# Patient Record
Sex: FEMALE | Race: WHITE | NOT HISPANIC OR LATINO
[De-identification: names, ages, dates, MRNs, and addresses within clinical notes are randomized per-mention and may not be internally consistent; named-entity substitution may affect disease eponyms.]

---

## 2018-01-10 ENCOUNTER — APPOINTMENT (OUTPATIENT)
Dept: INTERNAL MEDICINE | Facility: CLINIC | Age: 18
End: 2018-01-10
Payer: COMMERCIAL

## 2018-01-10 PROCEDURE — 36415 COLL VENOUS BLD VENIPUNCTURE: CPT

## 2018-01-10 PROCEDURE — 99203 OFFICE O/P NEW LOW 30 MIN: CPT | Mod: 25

## 2018-01-15 ENCOUNTER — APPOINTMENT (OUTPATIENT)
Dept: INTERNAL MEDICINE | Facility: CLINIC | Age: 18
End: 2018-01-15
Payer: COMMERCIAL

## 2018-01-15 PROCEDURE — 36415 COLL VENOUS BLD VENIPUNCTURE: CPT

## 2018-01-15 PROCEDURE — 99214 OFFICE O/P EST MOD 30 MIN: CPT | Mod: 25

## 2018-12-27 ENCOUNTER — FORM ENCOUNTER (OUTPATIENT)
Age: 18
End: 2018-12-27

## 2019-07-30 ENCOUNTER — APPOINTMENT (OUTPATIENT)
Dept: INTERNAL MEDICINE | Facility: CLINIC | Age: 19
End: 2019-07-30
Payer: COMMERCIAL

## 2019-07-30 VITALS
RESPIRATION RATE: 16 BRPM | WEIGHT: 140 LBS | HEART RATE: 81 BPM | DIASTOLIC BLOOD PRESSURE: 39 MMHG | BODY MASS INDEX: 26.43 KG/M2 | OXYGEN SATURATION: 98 % | HEIGHT: 61 IN | SYSTOLIC BLOOD PRESSURE: 91 MMHG

## 2019-07-30 PROCEDURE — 99214 OFFICE O/P EST MOD 30 MIN: CPT

## 2019-07-30 NOTE — HISTORY OF PRESENT ILLNESS
[FreeTextEntry1] : 18-year-old female who is now 36 weeks pregnant was seen at [facility] with acute pyelonephritis and septicemia with Klebsiella. Prior to this admission she also was seen by different infectious disease group for Klebsiella septicemia pyelonephritis complicated with ICU admission and respiratory failure with ARDS. During that admission patient had a ureteral stent placed and was discharged on IV antibiotics. She was continued on oral antibiotic suppression however was readmitted this past addition with Klebsiella septicemia and pyelonephritis at which time we saw the patient. Patient was to do with IV Zosyn which was switched to IV Invanz and discharged for easier once a day dosing. Patient is allergic to cephalosporin Omnicef which the allergy occurred when she was a child. No cephalosporin was trialed in the hospital so she is currently on IV ertapenem.\par She is here for followup. Reports no diarrhea. No fever no chills. No nausea no vomiting. No urinary symptoms.

## 2019-07-30 NOTE — PHYSICAL EXAM
[General Appearance - Alert] : alert [General Appearance - In No Acute Distress] : in no acute distress [General Appearance - Well Nourished] : well nourished [General Appearance - Well-Appearing] : healthy appearing [Sclera] : the sclera and conjunctiva were normal [PERRL With Normal Accommodation] : pupils were equal in size, round, reactive to light [Extraocular Movements] : extraocular movements were intact [Oropharynx] : the oropharynx was normal with no thrush [Outer Ear] : the ears and nose were normal in appearance [Neck Appearance] : the appearance of the neck was normal [Jugular Venous Distention Increased] : there was no jugular-venous distention [Respiration, Rhythm And Depth] : normal respiratory rhythm and effort [Exaggerated Use Of Accessory Muscles For Inspiration] : no accessory muscle use [Auscultation Breath Sounds / Voice Sounds] : lungs were clear to auscultation bilaterally [Heart Rate And Rhythm] : heart rate was normal and rhythm regular [Heart Sounds] : normal S1 and S2 [Full Pulse] : the pedal pulses are present [Edema] : there was no peripheral edema [Bowel Sounds] : normal bowel sounds [Abdomen Soft] : soft [Costovertebral Angle Tenderness] : no CVA tenderness [Musculoskeletal - Swelling] : no joint swelling [Range of Motion to Joints] : range of motion to joints [Nail Clubbing] : no clubbing  or cyanosis of the fingernails [Skin Color & Pigmentation] : normal skin color and pigmentation [Motor Tone] : muscle strength and tone were normal [] : no rash [Motor Exam] : the motor exam was normal [No Focal Deficits] : no focal deficits [Oriented To Time, Place, And Person] : oriented to person, place, and time [Affect] : the affect was normal

## 2019-07-30 NOTE — ASSESSMENT
[FreeTextEntry1] : 18-year-old female here for followup of Klebsiella septicemia and pyelonephritis currently on IV ertapenem\par \par \par Klebsiella septicemia\par Klebsiella pyelonephritis\par Pregnancy \par \par \par Recommendations:\par Patient completed 2 weeks of IV ertapenem for negative blood cultures\par Given she is high risk for infections or urinary tract and this is her second episode Will continue IV ertapenem for a longer duration\par Patient currently not having any side effects of the IV ertapenem\par Labs reviewed\par \par Followup in 2-4 weeks\par \par Counseling included lab results, differential diagnosis, treatment options, risks and benefits, lifestyle changes, current condition, medications and dose adjustments.\par The patient was interactive attentive and asked questions and verbalized understanding.\par

## 2020-04-14 ENCOUNTER — FORM ENCOUNTER (OUTPATIENT)
Age: 20
End: 2020-04-14

## 2021-05-18 ENCOUNTER — FORM ENCOUNTER (OUTPATIENT)
Age: 21
End: 2021-05-18

## 2021-11-17 ENCOUNTER — NON-APPOINTMENT (OUTPATIENT)
Age: 21
End: 2021-11-17

## 2021-12-17 ENCOUNTER — NON-APPOINTMENT (OUTPATIENT)
Age: 21
End: 2021-12-17

## 2021-12-17 DIAGNOSIS — Z98.890 OTHER SPECIFIED POSTPROCEDURAL STATES: ICD-10-CM

## 2021-12-17 DIAGNOSIS — Z78.9 OTHER SPECIFIED HEALTH STATUS: ICD-10-CM

## 2022-01-12 ENCOUNTER — APPOINTMENT (OUTPATIENT)
Dept: OBGYN | Facility: CLINIC | Age: 22
End: 2022-01-12

## 2022-02-09 ENCOUNTER — APPOINTMENT (OUTPATIENT)
Dept: OBGYN | Facility: CLINIC | Age: 22
End: 2022-02-09

## 2023-02-06 ENCOUNTER — APPOINTMENT (OUTPATIENT)
Dept: OBGYN | Facility: CLINIC | Age: 23
End: 2023-02-06
Payer: COMMERCIAL

## 2023-02-06 VITALS
HEIGHT: 61 IN | DIASTOLIC BLOOD PRESSURE: 80 MMHG | WEIGHT: 126 LBS | BODY MASS INDEX: 23.79 KG/M2 | SYSTOLIC BLOOD PRESSURE: 128 MMHG | HEART RATE: 123 BPM

## 2023-02-06 DIAGNOSIS — Z82.49 FAMILY HISTORY OF ISCHEMIC HEART DISEASE AND OTHER DISEASES OF THE CIRCULATORY SYSTEM: ICD-10-CM

## 2023-02-06 DIAGNOSIS — N76.0 ACUTE VAGINITIS: ICD-10-CM

## 2023-02-06 DIAGNOSIS — Z80.1 FAMILY HISTORY OF MALIGNANT NEOPLASM OF TRACHEA, BRONCHUS AND LUNG: ICD-10-CM

## 2023-02-06 DIAGNOSIS — Z09 ENCOUNTER FOR FOLLOW-UP EXAMINATION AFTER COMPLETED TREATMENT FOR CONDITIONS OTHER THAN MALIGNANT NEOPLASM: ICD-10-CM

## 2023-02-06 DIAGNOSIS — Z87.42 PERSONAL HISTORY OF OTHER DISEASES OF THE FEMALE GENITAL TRACT: ICD-10-CM

## 2023-02-06 DIAGNOSIS — Z86.19 PERSONAL HISTORY OF OTHER INFECTIOUS AND PARASITIC DISEASES: ICD-10-CM

## 2023-02-06 PROCEDURE — 81002 URINALYSIS NONAUTO W/O SCOPE: CPT

## 2023-02-06 PROCEDURE — 0500F INITIAL PRENATAL CARE VISIT: CPT

## 2023-02-06 NOTE — PLAN
[FreeTextEntry1] : Patient 22-year-old  3 para 1-0-1-1 last menstrual period 2022\par The patient presents as a new patient transfer OB care with due date of 2023\par \par Past medical history,,,, pyelonephritis, renal stones\par Past surgical history, calm, renal stents, lithotripsy, potentially curettage\par Allergies,,, Omnicef\par Medications,,, Augmentin daily 875 mg) vitamins\par Past OB history vaginal delivery x1 with a postpartum pyelonephritis–sepsis, calm, termination x1\par Past GYN history,,, medication 14, interval 28, duration 7 days, patient states she had been on birth control for approximately 1 year in the past, had a Mirena IUD for approximately 2-1/2 years, calm, and denies any history of pelvic infections or STDs\par Tobacco use,,, patient denies\par Alcohol use, cocaine patient denies\par Drug use,,, patient denies\par Family history,,, mother father alive and well,,, maternal great aunt with history of ovarian cancer\par \par Patient states she has been followed by maternal-fetal medicine at [facility] and also by urologist because of the recurrent UTIs and Cyrus nephritis and was recently hospitalized in December for pyelonephritis at which point a stent was advised to be placed and patient declined\par At this point patient is on Augmentin on a daily basis as a suppressive therapy\par Patient is asked to sign a record release to obtain all the previous records and will be referred to maternal-fetal medicine for further follow-up as needed

## 2023-02-06 NOTE — HISTORY OF PRESENT ILLNESS
[FreeTextEntry1] : Patient is a 22-year-old  3 para 1-0-1-1 last menstrual period 2022\par Patient presents as a transfer OB patient with a due date of

## 2023-02-07 PROBLEM — Z82.49 FAMILY HISTORY OF HYPERTENSION: Status: ACTIVE | Noted: 2023-02-07

## 2023-02-07 PROBLEM — Z80.1 FAMILY HISTORY OF LUNG CANCER: Status: ACTIVE | Noted: 2023-02-07

## 2023-02-07 PROBLEM — Z82.49 FAMILY HISTORY OF HYPOTENSION: Status: ACTIVE | Noted: 2023-02-07

## 2023-02-07 PROBLEM — Z86.19 HISTORY OF SEPSIS: Status: RESOLVED | Noted: 2023-02-07 | Resolved: 2023-02-07

## 2023-02-07 PROBLEM — N76.0 ACUTE VAGINITIS: Status: RESOLVED | Noted: 2021-11-17 | Resolved: 2023-02-07

## 2023-02-07 PROBLEM — Z87.42 HISTORY OF OVARIAN CYST: Status: RESOLVED | Noted: 2023-02-07 | Resolved: 2023-02-07

## 2023-02-07 RX ORDER — AMOXICILLIN AND CLAVULANATE POTASSIUM 500; 125 MG/1; 1/1
TABLET, FILM COATED ORAL
Refills: 0 | Status: ACTIVE | COMMUNITY

## 2023-02-21 ENCOUNTER — NON-APPOINTMENT (OUTPATIENT)
Age: 23
End: 2023-02-21

## 2023-02-22 ENCOUNTER — NON-APPOINTMENT (OUTPATIENT)
Age: 23
End: 2023-02-22

## 2023-02-22 ENCOUNTER — APPOINTMENT (OUTPATIENT)
Dept: ANTEPARTUM | Facility: CLINIC | Age: 23
End: 2023-02-22

## 2023-02-22 ENCOUNTER — APPOINTMENT (OUTPATIENT)
Dept: MATERNAL FETAL MEDICINE | Facility: CLINIC | Age: 23
End: 2023-02-22
Payer: COMMERCIAL

## 2023-02-22 VITALS
HEIGHT: 61 IN | RESPIRATION RATE: 18 BRPM | SYSTOLIC BLOOD PRESSURE: 100 MMHG | OXYGEN SATURATION: 98 % | DIASTOLIC BLOOD PRESSURE: 60 MMHG | HEART RATE: 88 BPM | WEIGHT: 128 LBS | BODY MASS INDEX: 24.17 KG/M2

## 2023-02-22 DIAGNOSIS — Z92.89 PERSONAL HISTORY OF OTHER MEDICAL TREATMENT: ICD-10-CM

## 2023-02-22 DIAGNOSIS — A49.8 OTHER BACTERIAL INFECTIONS OF UNSPECIFIED SITE: ICD-10-CM

## 2023-02-22 DIAGNOSIS — Z98.890 OTHER SPECIFIED POSTPROCEDURAL STATES: ICD-10-CM

## 2023-02-22 DIAGNOSIS — Z87.442 PERSONAL HISTORY OF URINARY CALCULI: ICD-10-CM

## 2023-02-22 DIAGNOSIS — N20.0 CALCULUS OF KIDNEY: ICD-10-CM

## 2023-02-22 DIAGNOSIS — Z87.448 PERSONAL HISTORY OF OTHER DISEASES OF URINARY SYSTEM: ICD-10-CM

## 2023-02-22 PROCEDURE — 99205 OFFICE O/P NEW HI 60 MIN: CPT

## 2023-02-22 NOTE — ACTIVE PROBLEMS
[Diabetes Mellitus] : no diabetes mellitus [Hypertension] : no hypertension [Heart Disease] : no heart disease [Autoimmune Disease] : no autoimmune disease [Neurologic Disorder] : no neurologic disorder, no epilepsy [Psychiatric Disorders] : no psychiatric disorders [Depression] : no depression, no post partum depression [Hepatic Disorder] : no hepatitis, no liver disease [Thrombophlebitis] : no varicosities, no phlebitis [Thyroid Disorder] : no thyroid dysfunction [Trauma] : no trauma/violence

## 2023-02-22 NOTE — DISCUSSION/SUMMARY
[FreeTextEntry1] : She is 22 weeks and 1 day gestation by her last menstrual period dates.\par \par She had pyelonephritis and sepsis during her prior pregnancy in 2019 at 22 weeks gestation. She was admitted to an ICU with respiratory failure due to ARDS, requiring ventilation for 3 days. She had a kidney stent placed. She was found to have anemia and was given 2 units of blood. She was d/c with a PICC line on antibiotics.  At 32 weeks gestation she was again hospitalized for another kidney infection & sepsis. She had a new kidney stent placed and was d/c home on antibiotics. This pregnancy she was hospitalized at [facility] on 2022 for treatment of pyelonephritis. A kidney stent was recommended by the urology consultant but patient declined. She has been placed on daily prophylactic antibiotics (Augmentin 875 mg). She is currently being followed monthly by a urologist (Dr. Baez). \par \par She was diagnosed with kidney stones during her prior pregnancy. She has been told that her episodes of pyelonephritis are due to kidney stones. She states that she had lithotripsy on 2022 before she became pregnant to destroy one large (22 mm) kidney stone in her left kidney. She still has kidney stones in both kidneys.  I recommended following the plan of care provided by the urology consultant. I told her that treatment of renal stones during pregnancy is usually conservative with hydration, appropriate antibiotic therapy, and pain relief with systemic analgesics.  I also told her that stent placement between the bladder and kidney under local anesthesia or sonographically guided percutaneous lithotomy can be performed during pregnancy when there is obstruction of the ureter by kidney stones. \par \par I recommended having the COVID 19 vaccine during pregnancy if not already vaccinated.  She told me that she has not received the COVID-19 vaccine. I told her that the American College of Obstetricians and Gynecologists (ACOG) and the Society for Maternal Fetal Medicine (SMFM) are recommending that all pregnant women be vaccinated against COVID-19. I discussed the benefits and risks of COVID-19 vaccination. I told her of the reported safety of the COVID-19 vaccines during pregnancy. There is no evidence of adverse maternal or fetal effects from vaccinating pregnant women with COVID-19 vaccine. The data has shown that COVID-19 infection puts pregnant women at increased risk of severe complications and death.  Maternal vaccination also provides protection to the  through the placental transfer of maternal antibodies. I told her that mild side effects such as pain at the site of injection, fever, muscle pain, joint pain, headaches, fatigue, and other symptoms may be present after vaccination. Tylenol (acetaminophen) is recommended for pregnant women who experience fever or other side effects. These side effects are a normal part of the body´s reaction to the vaccine and the developing of antibodies to protect against the COVID-19 illness. None of the COVID-19 vaccines available in the [place] cause infertility.  I recommend vaccination with the mRNA vaccines. There are rare allergic reactions (including anaphylaxis), 4.7 per million for Pfizer-DECA and 2.5 per million for Moderna following COVID-19 vaccination in nonpregnant individuals. Management of anaphylaxis in pregnant individuals is the same as in nonpregnant individuals. She told me that she is going to request to have the COVID 19 vaccine.\par \par I recommended having the Tdap vaccine between 27 and 36 weeks of gestation to prevent pertussis infection in the  infant. I recommended having the flu vaccine during flu season (October through May).  She told me that she received the flu vaccine during 2022.

## 2023-02-22 NOTE — VITALS
[LMP (date): ___] : LMP was on [unfilled] [GA =___ Weeks] : which calculates to a GA of [unfilled] weeks [GA= ___ Days] : and [unfilled] day(s) [MERVIN by LMP (date): ___] : The calculated MERVIN by LMP is [unfilled] [By LMP] : this is the final MERVIN

## 2023-02-22 NOTE — OB HISTORY
[Pregnancy History] : patient received anesthesia [___] : pregnancy complications occured [LMP: ___] : LMP: [unfilled] [MERVIN: ___] : MERVIN: [unfilled] [EGA: ___ wks] : EGA: [unfilled] wks [Spontaneous] : Spontaneous conception [Sonogram] : sonogram [at ___ wks] : at [unfilled] weeks [Definite:  ___ (Date)] : the last menstrual period was [unfilled] [Normal Amount/Duration] : was of a normal amount and duration [Regular Cycle Intervals] : periods have been regular [FreeTextEntry1] : She had a maternal-fetal medicine consultation at [facility] on 2/3/2023. Patient has transferred her care to Dr. Hemphill.  First prenatal visit was on 2/7/2023.  \par \par She had a noninvasive prenatal screen test done on 12/9/2022 that reported a low risk for fetal chromosomal abnormalities.  The fetal sex was reported to be male.\par \par She had fetal anatomy ultrasound examination on 2/3/2023 at 19 weeks of gestation at [facility].  No major fetal malformations were reported. [Spotting Between  Menses] : no spotting between menses [Menstrual Cramps] : no menstrual cramps [On BCP at conception] : the patient was not on BCP at conception

## 2023-02-22 NOTE — FAMILY HISTORY
[Age 35+ During Pregnancy] : not 35 or over during pregnancy [Reported Family History Of Birth Defects] : no congenital heart defects [Raúl-Sachs Carrier] : no Raúl-Sachs [Family History] : no mental retardation/autism [Reported Family History Of Genetic Disease] : no history of child defect in child of baby father

## 2023-03-06 ENCOUNTER — NON-APPOINTMENT (OUTPATIENT)
Age: 23
End: 2023-03-06

## 2023-03-06 ENCOUNTER — APPOINTMENT (OUTPATIENT)
Dept: OBGYN | Facility: CLINIC | Age: 23
End: 2023-03-06
Payer: COMMERCIAL

## 2023-03-06 VITALS
DIASTOLIC BLOOD PRESSURE: 75 MMHG | HEIGHT: 61 IN | WEIGHT: 132 LBS | HEART RATE: 118 BPM | BODY MASS INDEX: 24.92 KG/M2 | SYSTOLIC BLOOD PRESSURE: 122 MMHG

## 2023-03-06 PROCEDURE — 81002 URINALYSIS NONAUTO W/O SCOPE: CPT

## 2023-03-06 PROCEDURE — 0502F SUBSEQUENT PRENATAL CARE: CPT

## 2023-04-04 ENCOUNTER — APPOINTMENT (OUTPATIENT)
Dept: OBGYN | Facility: CLINIC | Age: 23
End: 2023-04-04
Payer: COMMERCIAL

## 2023-04-04 VITALS — WEIGHT: 140 LBS | DIASTOLIC BLOOD PRESSURE: 74 MMHG | HEART RATE: 116 BPM | SYSTOLIC BLOOD PRESSURE: 114 MMHG

## 2023-04-04 DIAGNOSIS — N76.0 ACUTE VAGINITIS: ICD-10-CM

## 2023-04-04 PROCEDURE — 81002 URINALYSIS NONAUTO W/O SCOPE: CPT

## 2023-04-04 PROCEDURE — 0502F SUBSEQUENT PRENATAL CARE: CPT

## 2023-04-05 LAB
BILIRUB UR QL STRIP: NORMAL
GLUCOSE UR-MCNC: NORMAL
HCG UR QL: 0.2 EU/DL
HGB UR QL STRIP.AUTO: NORMAL
KETONES UR-MCNC: NORMAL
LEUKOCYTE ESTERASE UR QL STRIP: NORMAL
NITRITE UR QL STRIP: NORMAL
PH UR STRIP: 6
PROT UR STRIP-MCNC: NORMAL
SP GR UR STRIP: 1.02

## 2023-04-06 ENCOUNTER — APPOINTMENT (OUTPATIENT)
Dept: OBGYN | Facility: CLINIC | Age: 23
End: 2023-04-06

## 2023-04-06 ENCOUNTER — NON-APPOINTMENT (OUTPATIENT)
Age: 23
End: 2023-04-06

## 2023-04-06 ENCOUNTER — ASOB RESULT (OUTPATIENT)
Age: 23
End: 2023-04-06

## 2023-04-10 ENCOUNTER — NON-APPOINTMENT (OUTPATIENT)
Age: 23
End: 2023-04-10

## 2023-04-10 DIAGNOSIS — B37.31 ACUTE CANDIDIASIS OF VULVA AND VAGINA: ICD-10-CM

## 2023-04-10 DIAGNOSIS — Z3A.22 22 WEEKS GESTATION OF PREGNANCY: ICD-10-CM

## 2023-04-10 LAB
A VAGINAE DNA VAG QL NAA+PROBE: NORMAL
BVAB2 DNA VAG QL NAA+PROBE: NORMAL
C KRUSEI DNA VAG QL NAA+PROBE: NEGATIVE
C KRUSEI DNA VAG QL NAA+PROBE: POSITIVE
MEGA1 DNA VAG QL NAA+PROBE: NORMAL
T VAGINALIS RRNA SPEC QL NAA+PROBE: NEGATIVE

## 2023-04-13 ENCOUNTER — NON-APPOINTMENT (OUTPATIENT)
Age: 23
End: 2023-04-13

## 2023-04-17 ENCOUNTER — NON-APPOINTMENT (OUTPATIENT)
Age: 23
End: 2023-04-17

## 2023-04-19 ENCOUNTER — ASOB RESULT (OUTPATIENT)
Age: 23
End: 2023-04-19

## 2023-04-19 ENCOUNTER — APPOINTMENT (OUTPATIENT)
Dept: ANTEPARTUM | Facility: CLINIC | Age: 23
End: 2023-04-19
Payer: COMMERCIAL

## 2023-04-19 PROCEDURE — 76816 OB US FOLLOW-UP PER FETUS: CPT

## 2023-04-25 ENCOUNTER — NON-APPOINTMENT (OUTPATIENT)
Age: 23
End: 2023-04-25

## 2023-04-25 ENCOUNTER — APPOINTMENT (OUTPATIENT)
Dept: OBGYN | Facility: CLINIC | Age: 23
End: 2023-04-25
Payer: COMMERCIAL

## 2023-04-25 VITALS — SYSTOLIC BLOOD PRESSURE: 100 MMHG | DIASTOLIC BLOOD PRESSURE: 69 MMHG | HEART RATE: 103 BPM | WEIGHT: 141 LBS

## 2023-04-25 DIAGNOSIS — Z11.3 ENCOUNTER FOR SCREENING FOR INFECTIONS WITH A PREDOMINANTLY SEXUAL MODE OF TRANSMISSION: ICD-10-CM

## 2023-04-25 PROCEDURE — 0502F SUBSEQUENT PRENATAL CARE: CPT

## 2023-04-27 ENCOUNTER — NON-APPOINTMENT (OUTPATIENT)
Age: 23
End: 2023-04-27

## 2023-05-01 ENCOUNTER — NON-APPOINTMENT (OUTPATIENT)
Age: 23
End: 2023-05-01

## 2023-05-01 LAB
C TRACH RRNA SPEC QL NAA+PROBE: NOT DETECTED
HPV HIGH+LOW RISK DNA PNL CVX: NOT DETECTED
N GONORRHOEA RRNA SPEC QL NAA+PROBE: NOT DETECTED
SOURCE TP AMPLIFICATION: NORMAL

## 2023-05-04 ENCOUNTER — NON-APPOINTMENT (OUTPATIENT)
Age: 23
End: 2023-05-04

## 2023-05-04 ENCOUNTER — RESULT REVIEW (OUTPATIENT)
Age: 23
End: 2023-05-04

## 2023-05-04 ENCOUNTER — OUTPATIENT (OUTPATIENT)
Dept: INPATIENT UNIT | Facility: HOSPITAL | Age: 23
LOS: 1 days | End: 2023-05-04
Payer: COMMERCIAL

## 2023-05-04 VITALS — HEART RATE: 113 BPM | SYSTOLIC BLOOD PRESSURE: 111 MMHG | DIASTOLIC BLOOD PRESSURE: 61 MMHG

## 2023-05-04 DIAGNOSIS — O47.03 FALSE LABOR BEFORE 37 COMPLETED WEEKS OF GESTATION, THIRD TRIMESTER: ICD-10-CM

## 2023-05-04 DIAGNOSIS — Z98.890 OTHER SPECIFIED POSTPROCEDURAL STATES: Chronic | ICD-10-CM

## 2023-05-04 LAB
ALBUMIN SERPL ELPH-MCNC: 3.3 G/DL — SIGNIFICANT CHANGE UP (ref 3.3–5.2)
ALP SERPL-CCNC: 132 U/L — HIGH (ref 40–120)
ALT FLD-CCNC: 7 U/L — SIGNIFICANT CHANGE UP
ANION GAP SERPL CALC-SCNC: 13 MMOL/L — SIGNIFICANT CHANGE UP (ref 5–17)
APPEARANCE UR: CLEAR — SIGNIFICANT CHANGE UP
AST SERPL-CCNC: 15 U/L — SIGNIFICANT CHANGE UP
BACTERIA # UR AUTO: ABNORMAL
BILIRUB SERPL-MCNC: 0.4 MG/DL — SIGNIFICANT CHANGE UP (ref 0.4–2)
BILIRUB UR-MCNC: NEGATIVE — SIGNIFICANT CHANGE UP
BUN SERPL-MCNC: 8 MG/DL — SIGNIFICANT CHANGE UP (ref 8–20)
CALCIUM SERPL-MCNC: 8.2 MG/DL — LOW (ref 8.4–10.5)
CHLORIDE SERPL-SCNC: 99 MMOL/L — SIGNIFICANT CHANGE UP (ref 96–108)
CO2 SERPL-SCNC: 19 MMOL/L — LOW (ref 22–29)
COLOR SPEC: YELLOW — SIGNIFICANT CHANGE UP
CREAT SERPL-MCNC: 0.82 MG/DL — SIGNIFICANT CHANGE UP (ref 0.5–1.3)
DIFF PNL FLD: ABNORMAL
EGFR: 102 ML/MIN/1.73M2 — SIGNIFICANT CHANGE UP
EPI CELLS # UR: SIGNIFICANT CHANGE UP
GLUCOSE SERPL-MCNC: 92 MG/DL — SIGNIFICANT CHANGE UP (ref 70–99)
GLUCOSE UR QL: NEGATIVE MG/DL — SIGNIFICANT CHANGE UP
HCT VFR BLD CALC: 23.4 % — LOW (ref 34.5–45)
HGB BLD-MCNC: 7.7 G/DL — LOW (ref 11.5–15.5)
KETONES UR-MCNC: NEGATIVE — SIGNIFICANT CHANGE UP
LACTATE SERPL-SCNC: 0.5 MMOL/L — SIGNIFICANT CHANGE UP (ref 0.5–2)
LEUKOCYTE ESTERASE UR-ACNC: ABNORMAL
MCHC RBC-ENTMCNC: 27.9 PG — SIGNIFICANT CHANGE UP (ref 27–34)
MCHC RBC-ENTMCNC: 32.9 GM/DL — SIGNIFICANT CHANGE UP (ref 32–36)
MCV RBC AUTO: 84.8 FL — SIGNIFICANT CHANGE UP (ref 80–100)
NITRITE UR-MCNC: NEGATIVE — SIGNIFICANT CHANGE UP
PH UR: 6.5 — SIGNIFICANT CHANGE UP (ref 5–8)
PLATELET # BLD AUTO: 282 K/UL — SIGNIFICANT CHANGE UP (ref 150–400)
POTASSIUM SERPL-MCNC: 3.4 MMOL/L — LOW (ref 3.5–5.3)
POTASSIUM SERPL-SCNC: 3.4 MMOL/L — LOW (ref 3.5–5.3)
PROT SERPL-MCNC: 6.9 G/DL — SIGNIFICANT CHANGE UP (ref 6.6–8.7)
PROT UR-MCNC: NEGATIVE — SIGNIFICANT CHANGE UP
RBC # BLD: 2.76 M/UL — LOW (ref 3.8–5.2)
RBC # FLD: 12.3 % — SIGNIFICANT CHANGE UP (ref 10.3–14.5)
RBC CASTS # UR COMP ASSIST: SIGNIFICANT CHANGE UP /HPF (ref 0–4)
SODIUM SERPL-SCNC: 131 MMOL/L — LOW (ref 135–145)
SP GR SPEC: 1 — LOW (ref 1.01–1.02)
UROBILINOGEN FLD QL: NEGATIVE MG/DL — SIGNIFICANT CHANGE UP
WBC # BLD: 7.32 K/UL — SIGNIFICANT CHANGE UP (ref 3.8–10.5)
WBC # FLD AUTO: 7.32 K/UL — SIGNIFICANT CHANGE UP (ref 3.8–10.5)
WBC UR QL: ABNORMAL /HPF (ref 0–5)

## 2023-05-04 PROCEDURE — 85027 COMPLETE CBC AUTOMATED: CPT

## 2023-05-04 PROCEDURE — 83605 ASSAY OF LACTIC ACID: CPT

## 2023-05-04 PROCEDURE — 99214 OFFICE O/P EST MOD 30 MIN: CPT | Mod: GC

## 2023-05-04 PROCEDURE — 76775 US EXAM ABDO BACK WALL LIM: CPT

## 2023-05-04 PROCEDURE — 87086 URINE CULTURE/COLONY COUNT: CPT

## 2023-05-04 PROCEDURE — G0463: CPT

## 2023-05-04 PROCEDURE — 81001 URINALYSIS AUTO W/SCOPE: CPT

## 2023-05-04 PROCEDURE — 80053 COMPREHEN METABOLIC PANEL: CPT

## 2023-05-04 PROCEDURE — 76775 US EXAM ABDO BACK WALL LIM: CPT | Mod: 26

## 2023-05-04 PROCEDURE — 59025 FETAL NON-STRESS TEST: CPT

## 2023-05-04 PROCEDURE — 0225U NFCT DS DNA&RNA 21 SARSCOV2: CPT

## 2023-05-04 PROCEDURE — 36415 COLL VENOUS BLD VENIPUNCTURE: CPT

## 2023-05-04 RX ORDER — SODIUM CHLORIDE 9 MG/ML
1000 INJECTION, SOLUTION INTRAVENOUS ONCE
Refills: 0 | Status: COMPLETED | OUTPATIENT
Start: 2023-05-04 | End: 2023-05-04

## 2023-05-04 RX ORDER — POTASSIUM CHLORIDE 20 MEQ
40 PACKET (EA) ORAL ONCE
Refills: 0 | Status: COMPLETED | OUTPATIENT
Start: 2023-05-04 | End: 2023-05-04

## 2023-05-04 RX ORDER — IRON SUCROSE 20 MG/ML
200 INJECTION, SOLUTION INTRAVENOUS ONCE
Refills: 0 | Status: COMPLETED | OUTPATIENT
Start: 2023-05-04 | End: 2023-05-04

## 2023-05-04 RX ADMIN — SODIUM CHLORIDE 1000 MILLILITER(S): 9 INJECTION, SOLUTION INTRAVENOUS at 23:04

## 2023-05-04 RX ADMIN — Medication 40 MILLIEQUIVALENT(S): at 20:27

## 2023-05-04 RX ADMIN — IRON SUCROSE 110 MILLIGRAM(S): 20 INJECTION, SOLUTION INTRAVENOUS at 22:39

## 2023-05-04 NOTE — OB PROVIDER TRIAGE NOTE - NSHPLABSRESULTS_GEN_ALL_CORE
7.7    7.32  )-----------( 282      ( 04 May 2023 17:49 )             23.4     05-04    131<L>  |  99  |  8.0  ----------------------------<  92  3.4<L>   |  19.0<L>  |  0.82    Ca    8.2<L>      04 May 2023 17:49    TPro  6.9  /  Alb  3.3  /  TBili  0.4  /  DBili  x   /  AST  15  /  ALT  7   /  AlkPhos  132<H>  05-04      Respiratory Viral Panel with COVID-19 by ROBBIE (05.04.23 @ 18:30)    Influenza B (RapRVP): Detected          RADIOLOGY:  < from: US Renal (05.04.23 @ 19:15) >      ACC: [id number] EXAM:  US KIDNEY(S)   ORDERED BY: SMITA RODRIGUEZ     PROCEDURE DATE:  05/04/2023          INTERPRETATION:  CLINICAL INFORMATION: Recurrent UTIs, pregnant    COMPARISON: None available.    TECHNIQUE: Sonography of the kidneys and bladder.    FINDINGS:  Right kidney: 12.8 cm. Moderate hydronephrosis. There are 2   nonobstructing calculi in the interpolar region measuring 0.3 cm and 0.4   cm. There is a cyst in the interpolar region measuring 3.3 cm.    Left kidney: 11.5 cm. Moderateto severe hydronephrosis. Multiple   nonobstructing calculi measuring up to 1.3 cm in the interpolar region   and lower pole. Additional nonobstructing calculi measuring 0.9 cm in the   lower pole and upper pole.    Urinary bladder: Within normal limits. Bilateral ureteral jets are seen    IMPRESSION:  Bilateral hydronephrosis, moderate on the right and moderate to severe on   the left.    Bilateral nonobstructing calculi, as described above, left greater than   right.    Bilateral ureteral jets are seen.    Additional findings as above.        --- End of Report ---            CAMILA MOORE MD; Attending Radiologist  This document has been electronically signed. May  4 2023  7:29PM    < end of copied text >

## 2023-05-04 NOTE — OB PROVIDER TRIAGE NOTE - NSICDXPASTMEDICALHX_GEN_ALL_CORE_FT
PAST MEDICAL HISTORY:  Chronic kidney infection     H/O sepsis     History of stent insertion of renal artery

## 2023-05-04 NOTE — OB PROVIDER TRIAGE NOTE - NSOBPROVIDERNOTE_OBGYN_ALL_OB_FT
Chra Ghosh is a 21yo  at 32w2d with history of urosepsis x2 in prior pregnancy presenting for a day of subjective fever and inability to urinate.    # Subjective Fever  - Patient was unable to take her temperature today but reports intermittent chills and reports subjective fever  - Patient took tylenol at 4:30 prior to coming to the hospital  - On admission patient is afebrile but tachycardic to 113bpm  - Given prior significant history of urosepsis and current suprapubic tenderness and L CVA tenderness, high suspicion for bacterial etiology  - Continue monitoring vital signs to see if patient meets SIRS criteria   - CBC shows WBC of 7.3, pending CMP and lactate  - Will send UA and urine culture   - Given prior history of kidney stones and urosepsis requiring stent placement, will order KUB ultrasound    #Cough   - Patient's cough has been productive of yellow-brown sputum and has caused post-tussive emesis  - On exam, lungs clear to auscultation bilaterally   - Pending RVP to assess for viral etiology of cough and subjective fever     #Anemia  - Patient's CBC shows Hgb of 7.7; prior lab's show patient's baseline Hgb to be 11  - Patient on PO iron  - Consider IV iron infusion    # 32 week pregnancy  - Patient's pregnancy complicated by recurrent UTIs, on intermittent augmentin   - Patient currently anemic with Hgb 7.7 on PO iron   - Patient failed 1 hour glucose test; pending results of 3 hour glucose testing  - Follow up outpatient with Dr. Hemphill Char Ghosh is a 21yo  at 32w2d with history of urosepsis x2 in prior pregnancy presenting for a day of subjective fever and inability to urinate.    # Subjective Fever  - Patient was unable to take her temperature today but reports intermittent chills and reports subjective fever  - Patient took tylenol at 4:30 prior to coming to the hospital  - On admission patient is afebrile but tachycardic to 113bpm  - Given prior significant history of urosepsis and current suprapubic tenderness and L CVA tenderness, high suspicion for bacterial etiology  - Continue monitoring vital signs to see if patient meets SIRS criteria   - 1 L bolus of LR for hydration    - CBC shows WBC of 7.3, pending CMP and lactate  - Will send UA and urine culture   - Given prior history of kidney stones and urosepsis requiring stent placement, will order KUB ultrasound    #Cough   - Patient's cough has been productive of yellow-brown sputum and has caused post-tussive emesis  - On exam, lungs clear to auscultation bilaterally   - Pending RVP to assess for viral etiology of cough and subjective fever     #Anemia  - Patient's CBC shows Hgb of 7.7; prior lab's show patient's baseline Hgb to be 11  - Patient on PO iron  - Consider IV iron infusion    # 32 week pregnancy  - Patient's pregnancy complicated by recurrent UTIs, on intermittent augmentin   - Patient currently anemic with Hgb 7.7 on PO iron   - Patient failed 1 hour glucose test; pending results of 3 hour glucose testing  - Follow up outpatient with Dr. Hemphill Char Ghosh is a 21yo  at 32w2d with history of urosepsis x2 in prior pregnancy presenting for a day of subjective fever and inability to urinate.    # Subjective Fever  - Reports intermittent chills and reports subjective fever  - On admission patient is afebrile but tachycardic to 113bpm  - Given prior significant history of urosepsis and current suprapubic tenderness and L CVA tenderness, high suspicion for bacterial etiology  - Continue monitoring vital signs to see if patient meets SIRS criteria   - 1 L bolus of LR for hydration    - CBC shows WBC of 7.3, pending CMP and lactate  - Will f/u UA and urine culture   - Given prior history of kidney stones and urosepsis requiring stent placement, will order KUB ultrasound    #Cough   - Patient's cough has been productive of yellow-brown sputum and has caused post-tussive emesis  - On exam, lungs clear to auscultation bilaterally   - Pending RVP to assess for viral etiology of cough and subjective fever     #Anemia  - Patient's CBC shows Hgb of 7.7  - Patient on PO iron  - Consider IV iron infusion    # 32 week pregnancy  - Denies obstretical complaints    Fetus: Reactive   [place]: no contractions     Discussed with Dr. Raymundo Char Ghosh is a 21yo  at 32w2d with history of urosepsis x2 in prior pregnancy presenting for a day of subjective fever and inability to urinate.    # Subjective Fever  - Reports intermittent chills and reports subjective fever  - On admission patient is afebrile but tachycardic to 113bpm  - Given prior significant history of urosepsis and current suprapubic tenderness and L CVA tenderness, high suspicion for bacterial etiology  - Continue monitoring vital signs to see if patient meets SIRS criteria   - 1 L bolus of LR for hydration    - CBC shows WBC of 7.3, pending CMP and lactate  - Will f/u UA and urine culture   - Given prior history of kidney stones and urosepsis requiring stent placement, will order KUB ultrasound    #Cough   - Patient's cough has been productive of yellow-brown sputum and has caused post-tussive emesis  - On exam, lungs clear to auscultation bilaterally   - Pending RVP to assess for viral etiology of cough and subjective fever     #Anemia  - Patient's CBC shows Hgb of 7.7  - Patient on PO iron  - Consider IV iron infusion    # 32 week pregnancy  - Denies obstretical complaints    Fetus: Reactive   [place]: no contractions     Discussed with Dr. Raymundo Char Ghosh is a 21yo  at 32w2d with history of urosepsis x2 in prior pregnancy presenting for a day of subjective fever and inability to urinate.    # Subjective Fever  - Reports intermittent chills and reports subjective fever  - On admission patient is afebrile but tachycardic to 113bpm  - Given prior significant history of urosepsis and current suprapubic tenderness and L CVA tenderness, high suspicion for bacterial etiology  - Continue monitoring vital signs to see if patient meets SIRS criteria   - 1 L bolus of LR for hydration    - CBC shows WBC of 7.3, pending CMP and lactate  - Will f/u UA and urine culture   - Given prior history of kidney stones and urosepsis requiring stent placement, will order KUB ultrasound    #Cough   - Patient's cough has been productive of yellow-brown sputum and has caused post-tussive emesis  - On exam, lungs clear to auscultation bilaterally   - Pending RVP to assess for viral etiology of cough and subjective fever     #Anemia  - Patient's CBC shows Hgb of 7.7  - Patient on PO iron  - Consider IV iron infusion    # 32 week pregnancy  - Denies obstretical complaints    Fetus: Reactive   [place]: no contractions     Discussed with Dr. Raymundo    Update 2140:  Patient feeling symptomatically improved. All renal studies came back stable from her outpatient studies. Continues to be afebrile. Patient to receive IV iron transfusion and then be discharge home with follow up with Dr. Hemphill. She will receive information regarding hematology follow up for anemia upon discharge.  labor return precautions reviewed.    Plan d/w Dr. Hemphill Char Ghosh is a 23yo  at 32w2d with history of urosepsis x2 in prior pregnancy presenting for a day of subjective fever and inability to urinate.      #History of nephrolithiasis  - Given prior history of kidney stones and urosepsis requiring stent placement, KUB Unchanged from 1st trimester renal US at [facility]  - Multiple non-obstructing calculi visualized    #Cough   - RVP: Influenza + , will send home with tamiflu 75mg BID x 5 days  - Patient's cough has been productive of yellow-brown sputum and has caused post-tussive emesis  - On exam, lungs clear to auscultation bilaterally   - Afebrile on presentation, but tachycardic to 113bpm> RESOLVED at the time of discharge to 96, improved after IVF hydration  - WBC: 7.32      #Anemia  - Patient's CBC shows Hgb of 7.7  - Patient on PO iron  - s/p IV iron transfusion   - Hematology follow up outpatient     # 32 week pregnancy  - Denies obstretical complaints    Fetus: Reactive   [place]: no contractions     Discussed with Dr. Raymundo    Update :  Patient feeling symptomatically improved. All renal studies came back stable from her outpatient studies. Continues to be afebrile. Patient to receive IV iron transfusion and then be discharge home with follow up with Dr. Hemphill. She will receive information regarding hematology follow up for anemia upon discharge.  labor return precautions reviewed.    Plan d/w Dr. Hemphill

## 2023-05-04 NOTE — OB PROVIDER TRIAGE NOTE - HISTORY OF PRESENT ILLNESS
Char Ghosh is a 21yo  at 32w2d with history of urosepsis x2 in prior pregnancy presenting for a day of subjective fever and inability to urinate. Patient reports that she began feeling cold symptoms 5 days ago. She then reported developing cough that was intermittently productive of yellow-brown sputum along with post-tussive emesis with lower abdominal cramping and lower back pain. Today she reports that she has been unable to urinate until 5:00pm. Today she began feeling chills and believes she had a fever, but was unable to take her temperature because she was at work. Denies any dysuria, hematuria, cloudy or foul smelling urine. Patient took a tylenol at 4:30pm prior to coming to the hospital. Denies any vaginal bleeding, vaginal leakage of fluids, contractions, or changes in fetal movement. Denies headaches or dyspnea. Denies history of kidney issues or UTIs prior to first pregnancy; has had recurrent UTIs and kidney traylor since first pregnancy. Has previously only been treated with augmentin and macrobid for UTIs.     Pregnancy course: Follows with Dr. Hemphill for [facility]. Pregnancy complicated by UTIs and anemia (on slow release iron). Failed 1 hour glucose test, pending results of 3 hour glucose test. Was prescribed prophylactic augmentin for recurrent UTIs; however, had candida infections from antibiotics so does not take regularly. MERVIN: 2023    POB: :  requiring D&C in   G2:  at 37 weeks in 2019. Complicated by urosepsis at 22weeks and 32 weeks requiring hospitalization and ventilator along with bilateral ureter stent placement; had anemia requiring multiple blood transfusions.    PGyn: Recurrent yeast infections while on Augmentin. Denies other gyn history.  PMH: Recurrent UTIs requiring ureter stents. Kidney stones. Prior blood transfusions   PSH: D&C, Ureter stent placement and removal, lithotripsy  Meds: Augmentin, PNVs  Allergies: Omnicef    Char Ghosh is a 23yo  at 32w2d with history of urosepsis x2 in prior pregnancy presenting for a day of subjective fever and inability to urinate. Patient reports that she began feeling cold symptoms 5 days ago. She then reported developing cough that was intermittently productive of yellow-brown sputum along with post-tussive emesis with lower abdominal cramping and lower back pain. Today she reports that she has been unable to urinate until 5:00pm. Today she began feeling chills and believes she had a fever, but was unable to take her temperature because she was at work. Denies any dysuria, hematuria, cloudy or foul smelling urine. Patient took a tylenol at 4:30pm prior to coming to the hospital. Denies any vaginal bleeding, vaginal leakage of fluids, contractions, or changes in fetal movement. Denies headaches or dyspnea. Denies history of kidney issues or UTIs prior to first pregnancy; has had recurrent UTIs and kidney stones since first pregnancy and follows with an outpatient urologist. Has previously been treated with augmentin and macrobid for UTIs.     MERVIN: 2023  LMP: 2022    Pregnancy course: Follows with Dr. Hemphill for PNC. Pregnancy complicated by:  Reccurent UTIs; Was prescribed prophylactic augmentin for recurrent UTIs; however, had candida infections from antibiotics so does not take regularly.  Anemia (on slow release iron).   Failed GCT, pending GTT.      POB: :  requiring D&C in 2016  G2:  at 37 weeks in 2019. Complicated by urosepsis at 22weeks and 32 weeks requiring hospitalization and ventilator along with bilateral ureter stent placement; had anemia requiring multiple blood transfusions.  PGyn: Recurrent yeast infections while on Augmentin. Denies other gyn history.  PMH: Recurrent UTIs requiring ureter stents. Kidney stones. Prior blood transfusions   PSH: D&C, Ureter stent placement and removal, lithotripsy  Meds: Augmentin, PNVs  Allergies: Omnicef    Char Ghosh is a 23yo  at 32w2d by  LMP  (22)   MERVIN:    21 with history of urosepsis x2 in prior pregnancy presenting for a day of subjective fever and inability to urinate.        Patient reports that she began feeling cold symptoms 5 days ago. She then reported developing cough that was intermittently productive of yellow-brown sputum along with post-tussive emesis with lower abdominal cramping and lower back pain. Today she reports that she has been unable to urinate until 5:00pm. Today she began feeling chills and believes she had a fever, but was unable to take her temperature because she was at work. Denies any dysuria, hematuria, cloudy or foul smelling urine. Patient took a tylenol at 4:30pm prior to coming to the hospital. Denies any vaginal bleeding, vaginal leakage of fluids, contractions, or changes in fetal movement. Denies headaches or dyspnea. Denies history of kidney issues or UTIs prior to first pregnancy; has had recurrent UTIs and kidney stones since first pregnancy and follows with an outpatient urologist. Has previously been treated with augmentin and macrobid for UTIs.     MERVIN: 2023  LMP: 2022    Pregnancy course: Follows with Dr. Hemphill for PNC. Pregnancy complicated by:  Reccurent UTIs; Was prescribed prophylactic augmentin for recurrent UTIs; however, had candida infections from antibiotics so does not take regularly.  Anemia (on slow release iron).   Failed GCT, pending GTT.      POB: :  requiring D&C in 2016  G2:  at 37 weeks in 2019. Complicated by urosepsis at 22weeks and 32 weeks requiring hospitalization and ventilator along with bilateral ureter stent placement; had anemia requiring multiple blood transfusions.  PGyn: Recurrent yeast infections while on Augmentin. Denies other gyn history.  PMH: Recurrent UTIs requiring ureter stents. Kidney stones. Prior blood transfusions   PSH: D&C, Ureter stent placement and removal, lithotripsy  Meds: Augmentin, PNVs  Allergies: Omnicef

## 2023-05-04 NOTE — OB PROVIDER TRIAGE NOTE - NSHPPHYSICALEXAM_GEN_ALL_CORE
General: NAD, resting comfortably  HEENT: no conjunctival pallor, no rhinorrhea, moist mucous membranes  Pulmonary: CTA bilaterally with no WRR  Abdomen: Gravid abdomen, suprapubic tenderness to palpation, L CVA tenderness Vital Signs Last 24 Hrs  T(C): 36.8 (04 May 2023 18:22), Max: 36.8 (04 May 2023 18:22)  T(F): 98.2 (04 May 2023 18:22), Max: 98.2 (04 May 2023 18:22)  HR: 113 (04 May 2023 18:22) (113 - 113)  BP: 111/61 (04 May 2023 18:22) (111/61 - 111/61)  RR: 18 (04 May 2023 18:22) (18 - 18)    General: NAD, resting comfortably  HEENT: no conjunctival pallor, no rhinorrhea, moist mucous membranes  Pulmonary: CTA bilaterally with no WRR  Abdomen: Gravid abdomen, suprapubic tenderness to palpation, L CVA tenderness Vital Signs Last 24 Hrs  T(C): 36.8 (04 May 2023 18:22), Max: 36.8 (04 May 2023 18:22)  T(F): 98.2 (04 May 2023 18:22), Max: 98.2 (04 May 2023 18:22)  HR: 113 (04 May 2023 18:22) (113 - 113)  BP: 111/61 (04 May 2023 18:22) (111/61 - 111/61)  RR: 18 (04 May 2023 18:22) (18 - 18)    General: NAD, resting comfortably  HEENT: no conjunctival pallor, no rhinorrhea, moist mucous membranes  Pulmonary: CTA bilaterally with no WRR  Abdomen: Gravid abdomen, suprapubic tenderness to palpation, L CVA tenderness    FHT: 140 bpm, moderate variability + accels no decels present  TOCO: No CTX

## 2023-05-04 NOTE — OB PROVIDER TRIAGE NOTE - ATTENDING COMMENTS
21yo  at 32w2d with history of urosepsis x2 in prior pregnancy now with influenza, no s/s symptomatic kidney stones, urosepsis or  labor, agree with above re Tamiflu and precautions re complications of influenza in pregnancy. Iron infusion finished.

## 2023-05-04 NOTE — OB RN TRIAGE NOTE - FALL HARM RISK - UNIVERSAL INTERVENTIONS
Bed in lowest position, wheels locked, appropriate side rails in place/Call bell, personal items and telephone in reach/Instruct patient to call for assistance before getting out of bed or chair/Non-slip footwear when patient is out of bed/[place] to call system/Physically safe environment - no spills, clutter or unnecessary equipment/Purposeful Proactive Rounding/Room/bathroom lighting operational, light cord in reach

## 2023-05-05 VITALS — HEART RATE: 96 BPM | DIASTOLIC BLOOD PRESSURE: 61 MMHG | SYSTOLIC BLOOD PRESSURE: 104 MMHG

## 2023-05-05 LAB
FLUBV RNA SPEC QL NAA+PROBE: DETECTED
HMPV RNA SPEC QL NAA+PROBE: DETECTED
RAPID RVP RESULT: DETECTED
SARS-COV-2 RNA SPEC QL NAA+PROBE: SIGNIFICANT CHANGE UP

## 2023-05-06 LAB
CULTURE RESULTS: NO GROWTH — SIGNIFICANT CHANGE UP
SPECIMEN SOURCE: SIGNIFICANT CHANGE UP

## 2023-05-08 ENCOUNTER — OUTPATIENT (OUTPATIENT)
Dept: OUTPATIENT SERVICES | Facility: HOSPITAL | Age: 23
LOS: 1 days | Discharge: HOME | End: 2023-05-08

## 2023-05-08 DIAGNOSIS — D64.9 ANEMIA, UNSPECIFIED: ICD-10-CM

## 2023-05-08 DIAGNOSIS — Z98.890 OTHER SPECIFIED POSTPROCEDURAL STATES: Chronic | ICD-10-CM

## 2023-05-08 PROBLEM — N15.9 RENAL TUBULO-INTERSTITIAL DISEASE, UNSPECIFIED: Chronic | Status: ACTIVE | Noted: 2023-05-04

## 2023-05-08 PROBLEM — Z86.19 PERSONAL HISTORY OF OTHER INFECTIOUS AND PARASITIC DISEASES: Chronic | Status: ACTIVE | Noted: 2023-05-04

## 2023-05-09 ENCOUNTER — NON-APPOINTMENT (OUTPATIENT)
Age: 23
End: 2023-05-09

## 2023-05-09 ENCOUNTER — APPOINTMENT (OUTPATIENT)
Dept: OBGYN | Facility: CLINIC | Age: 23
End: 2023-05-09
Payer: COMMERCIAL

## 2023-05-09 VITALS — SYSTOLIC BLOOD PRESSURE: 111 MMHG | WEIGHT: 138 LBS | DIASTOLIC BLOOD PRESSURE: 77 MMHG | HEART RATE: 99 BPM

## 2023-05-09 PROCEDURE — 0502F SUBSEQUENT PRENATAL CARE: CPT

## 2023-05-11 ENCOUNTER — NON-APPOINTMENT (OUTPATIENT)
Age: 23
End: 2023-05-11

## 2023-05-11 ENCOUNTER — RESULT REVIEW (OUTPATIENT)
Age: 23
End: 2023-05-11

## 2023-05-11 ENCOUNTER — APPOINTMENT (OUTPATIENT)
Dept: HEMATOLOGY ONCOLOGY | Facility: CLINIC | Age: 23
End: 2023-05-11
Payer: COMMERCIAL

## 2023-05-11 VITALS
HEIGHT: 61 IN | SYSTOLIC BLOOD PRESSURE: 106 MMHG | WEIGHT: 135.04 LBS | DIASTOLIC BLOOD PRESSURE: 70 MMHG | BODY MASS INDEX: 25.49 KG/M2 | HEART RATE: 92 BPM | OXYGEN SATURATION: 97 %

## 2023-05-11 LAB
BACTERIA UR CULT: NORMAL
BASOPHILS # BLD AUTO: 0 K/UL — SIGNIFICANT CHANGE UP (ref 0–0.2)
BASOPHILS NFR BLD AUTO: 0.3 % — SIGNIFICANT CHANGE UP (ref 0–2)
EOSINOPHIL # BLD AUTO: 0.1 K/UL — SIGNIFICANT CHANGE UP (ref 0–0.5)
EOSINOPHIL NFR BLD AUTO: 0.7 % — SIGNIFICANT CHANGE UP (ref 0–6)
HCT VFR BLD CALC: 26.8 % — LOW (ref 34.5–45)
HGB BLD-MCNC: 8.8 G/DL — LOW (ref 11.5–15.5)
LYMPHOCYTES # BLD AUTO: 1.5 K/UL — SIGNIFICANT CHANGE UP (ref 1–3.3)
LYMPHOCYTES # BLD AUTO: 20.4 % — SIGNIFICANT CHANGE UP (ref 13–44)
MCHC RBC-ENTMCNC: 28.3 PG — SIGNIFICANT CHANGE UP (ref 27–34)
MCHC RBC-ENTMCNC: 33 G/DL — SIGNIFICANT CHANGE UP (ref 32–36)
MCV RBC AUTO: 85.8 FL — SIGNIFICANT CHANGE UP (ref 80–100)
MONOCYTES # BLD AUTO: 0.5 K/UL — SIGNIFICANT CHANGE UP (ref 0–0.9)
MONOCYTES NFR BLD AUTO: 7 % — SIGNIFICANT CHANGE UP (ref 2–14)
NEUTROPHILS # BLD AUTO: 5.1 K/UL — SIGNIFICANT CHANGE UP (ref 1.8–7.4)
NEUTROPHILS NFR BLD AUTO: 71.5 % — SIGNIFICANT CHANGE UP (ref 43–77)
PLATELET # BLD AUTO: 363 K/UL — SIGNIFICANT CHANGE UP (ref 150–400)
RBC # BLD: 3.12 M/UL — LOW (ref 3.8–5.2)
RBC # FLD: 13.1 % — SIGNIFICANT CHANGE UP (ref 10.3–14.5)
WBC # BLD: 7.2 K/UL — SIGNIFICANT CHANGE UP (ref 3.8–10.5)
WBC # FLD AUTO: 7.2 K/UL — SIGNIFICANT CHANGE UP (ref 3.8–10.5)

## 2023-05-11 PROCEDURE — 99204 OFFICE O/P NEW MOD 45 MIN: CPT

## 2023-05-12 LAB
FERRITIN SERPL-MCNC: 515 NG/ML
FOLATE SERPL-MCNC: 5.9 NG/ML
IRON SATN MFR SERPL: 13 %
IRON SERPL-MCNC: 56 UG/DL
TIBC SERPL-MCNC: 443 UG/DL
UIBC SERPL-MCNC: 387 UG/DL
VIT B12 SERPL-MCNC: 153 PG/ML

## 2023-05-12 NOTE — ADDENDUM
[FreeTextEntry1] : Documented by Laura Alvarado acting as scribe for Dr. Brambila on 05/11/2023.\par \par All Medical record entries made by the Scribe were at my, Dr. Brambila, direction and personally dictated by me on 05/11/2023. I have reviewed the chart and agree that the record accurately reflects my personal performance of the history, physical exam, assessment and plan. I have also personally directed, reviewed, and agreed with the discharge instructions.

## 2023-05-12 NOTE — ASSESSMENT
[FreeTextEntry1] : 22 year old F (, LMP 22,  MERVIN 23, planning for vaginal delivery) with a PMHx of h/o blood transfusions x 3 with previous pregnancy and kidney disease, here for evaluation of anemia with pregnancy. S/p admission to [facility] L&D ED on 23 - 23 d/t feeling sluggish, cold sweats, chills x 1 week and noted with Hb 7.7 g, S/p Venofer 200 mg on 23. \par \par Hgb today has improved from 7.7 g/dL to 8.8 g/dl. Ferritin elevated at ~500 likely secondary to recent venofer, %sat is 13, TIBC is elevated. B12 is 150 mcg/daily\par \par Plan\par Iron deficiency / anemia in pregnancy - proceed with additional venofer 200 mg weekly x 4. Possible SE reviewed. Pt is agreeable and signed consent form. \par Vitamin B12 deficiency - start B12 500 mcg daily\par Continue prenatal MV tabs \par NP follow up in 1 month

## 2023-05-12 NOTE — HISTORY OF PRESENT ILLNESS
[de-identified] : RODRÍGUEZ MCNULTY is a 22 year old F (, LMP 22,  MERVIN 23, planning for vaginal delivery) with a PMHx of h/o blood transfusions x 3 with previous pregnancy, lithotripsy, infection due to Klebsiella pneumoniae, renal calculi, pyelonephritis, recurrent UTI, moderate hydronephrosis here for evaluation of anemia with pregnancy. S/p admission to [facility] L&D ED on 23 - 23 d/t feeling sluggish, cold sweats, chills x 1 week and noted with Hb 7.7 g, S/p Venofer 200 mg on 23. Patient was referred by GYN, Dr. Hemphill. \par \par Patient presents for initial visit\par Reports h/o kidney disease and ED visits q 1 -3 months d/t complications \par Reports regular follow up with Urologist\par \par PMHx: h/o blood transfusions, lithotripsy, infection due to Klebsiella pneumoniae, renal calculi, pyelonephritis, recurrent UTI, moderate hydronephrosis\par Socx: Never smoker \par \par Care Team: \par GYN, Dr. Hemphill\par \par

## 2023-05-12 NOTE — CONSULT LETTER
[Dear  ___] : Dear  [unfilled], [Consult Letter:] : I had the pleasure of evaluating your patient, [unfilled]. [Please see my note below.] : Please see my note below. [Consult Closing:] : Thank you very much for allowing me to participate in the care of this patient.  If you have any questions, please do not hesitate to contact me. [Sincerely,] : Sincerely, [FreeTextEntry3] : Stephanie Brambila MD\par Medical Oncology/Hematology\par [facility] [place], [facility]\par \par \par [facility] [employer] at [facility]\par

## 2023-05-16 ENCOUNTER — APPOINTMENT (OUTPATIENT)
Dept: OBGYN | Facility: CLINIC | Age: 23
End: 2023-05-16
Payer: COMMERCIAL

## 2023-05-16 ENCOUNTER — OUTPATIENT (OUTPATIENT)
Dept: INPATIENT UNIT | Facility: HOSPITAL | Age: 23
LOS: 1 days | End: 2023-05-16
Payer: COMMERCIAL

## 2023-05-16 VITALS — DIASTOLIC BLOOD PRESSURE: 75 MMHG | SYSTOLIC BLOOD PRESSURE: 113 MMHG | HEART RATE: 101 BPM | WEIGHT: 137 LBS

## 2023-05-16 VITALS
SYSTOLIC BLOOD PRESSURE: 117 MMHG | TEMPERATURE: 98 F | HEART RATE: 91 BPM | RESPIRATION RATE: 16 BRPM | DIASTOLIC BLOOD PRESSURE: 61 MMHG

## 2023-05-16 VITALS — SYSTOLIC BLOOD PRESSURE: 112 MMHG | DIASTOLIC BLOOD PRESSURE: 67 MMHG | HEART RATE: 92 BPM

## 2023-05-16 DIAGNOSIS — Z98.890 OTHER SPECIFIED POSTPROCEDURAL STATES: Chronic | ICD-10-CM

## 2023-05-16 DIAGNOSIS — O47.02 FALSE LABOR BEFORE 37 COMPLETED WEEKS OF GESTATION, SECOND TRIMESTER: ICD-10-CM

## 2023-05-16 PROCEDURE — 87591 N.GONORRHOEAE DNA AMP PROB: CPT

## 2023-05-16 PROCEDURE — 76818 FETAL BIOPHYS PROFILE W/NST: CPT

## 2023-05-16 PROCEDURE — 87491 CHLMYD TRACH DNA AMP PROBE: CPT

## 2023-05-16 PROCEDURE — 59025 FETAL NON-STRESS TEST: CPT

## 2023-05-16 PROCEDURE — 87800 DETECT AGNT MULT DNA DIREC: CPT

## 2023-05-16 PROCEDURE — 0502F SUBSEQUENT PRENATAL CARE: CPT

## 2023-05-16 PROCEDURE — G0463: CPT

## 2023-05-16 PROCEDURE — 36415 COLL VENOUS BLD VENIPUNCTURE: CPT

## 2023-05-16 PROCEDURE — 87070 CULTURE OTHR SPECIMN AEROBIC: CPT

## 2023-05-16 NOTE — OB PROVIDER TRIAGE NOTE - NSOBPROVIDERNOTE_OBGYN_ALL_OB_FT
Char Ghosh is a 21yo  at 34w by  LMP  (22)   MERVIN: 23 who presents to L&D w/ complaints of contraction like pain x 3 days; r/o PTL    Plan:  - VSS  - Reactive tracing, no ctx on toco. NO clinical signs of  labor  - SSPE: Closed cervix, no signs of bleeding or gross pooling  - GBS, BV and gonorrhea/chlamydia cultures sent  - Patient to be discharged home. Patient counseled to return to L&D if presenting with leakage of fluid, vaginal bleeding, increased frequency/severity of contractions, or decreased fetal movement.  - Patient has appointment with Dr. Hemphill next week      Plan d/w Dr. Hemphill

## 2023-05-16 NOTE — OB PROVIDER TRIAGE NOTE - HISTORY OF PRESENT ILLNESS
Char Ghosh is a 23yo  at 34w by  LMP  (22)   MERVIN: 23 who presents to L&D w/ complaints of contraction like pain x 3 days.    Patient has been experiencing intermittent contraction like pain with increased vaginal discharge. Reports increased thick white discharge over the past several days. Was concerned that cramping pain was possible labor, and called her doctor who instructed her to come to L&D for evaluation.   Patient currently denies any contraction like pain. Reports good fetal movement and denies any LOF, vaginal bleeding.     Pregnancy course: Follows with Dr. Hemphill for PNC. Pregnancy complicated by:  Reccurent UTIs; Was prescribed prophylactic augmentin for recurrent UTIs; however, had candida infections from antibiotics so does not take regularly.  Anemia (on slow release iron).   Failed GCT, pending GTT.      POB: :  requiring D&C in 2016  G2:  at 37 weeks in 2019. Complicated by urosepsis at 22weeks and 32 weeks requiring hospitalization and ventilator along with bilateral ureter stent placement; had anemia requiring multiple blood transfusions.  PGyn: Recurrent yeast infections while on Augmentin. Denies other gyn history.  PMH: Recurrent UTIs requiring ureter stents. Kidney stones. Prior blood transfusions   PSH: D&C, Ureter stent placement and removal, lithotripsy  Meds: Augmentin, PNVs  Allergies: Omnicef

## 2023-05-16 NOTE — OB PROVIDER TRIAGE NOTE - NSHPPHYSICALEXAM_GEN_ALL_CORE
ICU Vital Signs Last 24 Hrs  T(C): 36.8 (16 May 2023 12:05), Max: 36.8 (16 May 2023 12:05)  T(F): 98.2 (16 May 2023 12:05), Max: 98.2 (16 May 2023 12:05)  HR: 92 (16 May 2023 13:16) (91 - 92)  BP: 112/67 (16 May 2023 13:16) (112/67 - 117/61)  RR: 16 (16 May 2023 12:05) (16 - 16)  O2 Parameters below as of 16 May 2023 12:05  Patient On (Oxygen Delivery Method): room air    General: NAD, resting comfortably  HEENT: no conjunctival pallor, no rhinorrhea, moist mucous membranes  Pulmonary: CTA b/l  Abd: soft, nontender, gravid  SSPE: No vaginal bleeding. No pooling. Physiologic discharge   SVE: 0/0/-3  US: Cephalic, anterior placenta. BPP 8/8, GUANACO 10.5  FHT: 140 bpm, moderate variability + accels no decels present  TOCO: No CTX

## 2023-05-17 ENCOUNTER — ASOB RESULT (OUTPATIENT)
Age: 23
End: 2023-05-17

## 2023-05-17 ENCOUNTER — APPOINTMENT (OUTPATIENT)
Dept: ANTEPARTUM | Facility: CLINIC | Age: 23
End: 2023-05-17
Payer: COMMERCIAL

## 2023-05-17 LAB
C TRACH RRNA SPEC QL NAA+PROBE: SIGNIFICANT CHANGE UP
CANDIDA AB TITR SER: SIGNIFICANT CHANGE UP
G VAGINALIS DNA SPEC QL NAA+PROBE: SIGNIFICANT CHANGE UP
N GONORRHOEA RRNA SPEC QL NAA+PROBE: SIGNIFICANT CHANGE UP
T VAGINALIS SPEC QL WET PREP: SIGNIFICANT CHANGE UP

## 2023-05-17 PROCEDURE — 76816 OB US FOLLOW-UP PER FETUS: CPT

## 2023-05-17 PROCEDURE — 76819 FETAL BIOPHYS PROFIL W/O NST: CPT

## 2023-05-18 ENCOUNTER — APPOINTMENT (OUTPATIENT)
Dept: MATERNAL FETAL MEDICINE | Facility: CLINIC | Age: 23
End: 2023-05-18
Payer: COMMERCIAL

## 2023-05-18 ENCOUNTER — ASOB RESULT (OUTPATIENT)
Age: 23
End: 2023-05-18

## 2023-05-18 VITALS — HEIGHT: 61 IN | BODY MASS INDEX: 25.86 KG/M2 | WEIGHT: 137 LBS

## 2023-05-18 LAB
BACTERIA UR CULT: NORMAL
CULTURE RESULTS: SIGNIFICANT CHANGE UP
SPECIMEN SOURCE: SIGNIFICANT CHANGE UP

## 2023-05-18 PROCEDURE — G0108 DIAB MANAGE TRN  PER INDIV: CPT | Mod: 95

## 2023-05-22 ENCOUNTER — APPOINTMENT (OUTPATIENT)
Dept: OBGYN | Facility: CLINIC | Age: 23
End: 2023-05-22
Payer: COMMERCIAL

## 2023-05-22 VITALS
WEIGHT: 137 LBS | BODY MASS INDEX: 25.86 KG/M2 | SYSTOLIC BLOOD PRESSURE: 104 MMHG | DIASTOLIC BLOOD PRESSURE: 71 MMHG | HEART RATE: 105 BPM | HEIGHT: 61 IN

## 2023-05-22 PROCEDURE — 0502F SUBSEQUENT PRENATAL CARE: CPT

## 2023-06-01 ENCOUNTER — APPOINTMENT (OUTPATIENT)
Dept: OBGYN | Facility: CLINIC | Age: 23
End: 2023-06-01
Payer: COMMERCIAL

## 2023-06-01 ENCOUNTER — NON-APPOINTMENT (OUTPATIENT)
Age: 23
End: 2023-06-01

## 2023-06-01 ENCOUNTER — APPOINTMENT (OUTPATIENT)
Dept: MATERNAL FETAL MEDICINE | Facility: CLINIC | Age: 23
End: 2023-06-01
Payer: COMMERCIAL

## 2023-06-01 ENCOUNTER — ASOB RESULT (OUTPATIENT)
Age: 23
End: 2023-06-01

## 2023-06-01 VITALS
BODY MASS INDEX: 26.43 KG/M2 | SYSTOLIC BLOOD PRESSURE: 114 MMHG | HEIGHT: 61 IN | DIASTOLIC BLOOD PRESSURE: 72 MMHG | HEART RATE: 105 BPM | WEIGHT: 140 LBS

## 2023-06-01 DIAGNOSIS — Z71.85 ENCOUNTER FOR IMMUNIZATION SAFETY COUNSELING: ICD-10-CM

## 2023-06-01 PROCEDURE — 0502F SUBSEQUENT PRENATAL CARE: CPT

## 2023-06-01 PROCEDURE — G0108 DIAB MANAGE TRN  PER INDIV: CPT | Mod: 95

## 2023-06-02 ENCOUNTER — APPOINTMENT (OUTPATIENT)
Age: 23
End: 2023-06-02

## 2023-06-05 ENCOUNTER — APPOINTMENT (OUTPATIENT)
Dept: MATERNAL FETAL MEDICINE | Facility: CLINIC | Age: 23
End: 2023-06-05

## 2023-06-05 DIAGNOSIS — O99.019 ANEMIA COMPLICATING PREGNANCY, UNSPECIFIED TRIMESTER: ICD-10-CM

## 2023-06-05 DIAGNOSIS — D50.9 IRON DEFICIENCY ANEMIA, UNSPECIFIED: ICD-10-CM

## 2023-06-05 LAB
B-HEM STREP SPEC QL CULT: NORMAL
BACTERIA UR CULT: NORMAL

## 2023-06-06 ENCOUNTER — NON-APPOINTMENT (OUTPATIENT)
Age: 23
End: 2023-06-06

## 2023-06-07 ENCOUNTER — APPOINTMENT (OUTPATIENT)
Age: 23
End: 2023-06-07

## 2023-06-08 ENCOUNTER — RESULT REVIEW (OUTPATIENT)
Age: 23
End: 2023-06-08

## 2023-06-08 ENCOUNTER — APPOINTMENT (OUTPATIENT)
Dept: OBGYN | Facility: CLINIC | Age: 23
End: 2023-06-08
Payer: COMMERCIAL

## 2023-06-08 ENCOUNTER — APPOINTMENT (OUTPATIENT)
Dept: HEMATOLOGY ONCOLOGY | Facility: CLINIC | Age: 23
End: 2023-06-08
Payer: COMMERCIAL

## 2023-06-08 VITALS
WEIGHT: 142 LBS | HEIGHT: 61 IN | BODY MASS INDEX: 26.81 KG/M2 | HEART RATE: 102 BPM | SYSTOLIC BLOOD PRESSURE: 114 MMHG | DIASTOLIC BLOOD PRESSURE: 76 MMHG

## 2023-06-08 VITALS
WEIGHT: 141.01 LBS | HEIGHT: 61 IN | DIASTOLIC BLOOD PRESSURE: 80 MMHG | SYSTOLIC BLOOD PRESSURE: 115 MMHG | HEART RATE: 92 BPM | BODY MASS INDEX: 26.62 KG/M2 | OXYGEN SATURATION: 99 % | TEMPERATURE: 97.7 F

## 2023-06-08 LAB
BASOPHILS # BLD AUTO: 0 K/UL — SIGNIFICANT CHANGE UP (ref 0–0.2)
BASOPHILS NFR BLD AUTO: 0.3 % — SIGNIFICANT CHANGE UP (ref 0–2)
BILIRUB UR QL STRIP: NORMAL
EOSINOPHIL # BLD AUTO: 0.1 K/UL — SIGNIFICANT CHANGE UP (ref 0–0.5)
EOSINOPHIL NFR BLD AUTO: 0.8 % — SIGNIFICANT CHANGE UP (ref 0–6)
GLUCOSE UR-MCNC: NORMAL
HCG UR QL: 0.2 EU/DL
HCT VFR BLD CALC: 27 % — LOW (ref 34.5–45)
HGB BLD-MCNC: 9.1 G/DL — LOW (ref 11.5–15.5)
HGB UR QL STRIP.AUTO: NORMAL
KETONES UR-MCNC: NORMAL
LEUKOCYTE ESTERASE UR QL STRIP: NORMAL
LYMPHOCYTES # BLD AUTO: 1.6 K/UL — SIGNIFICANT CHANGE UP (ref 1–3.3)
LYMPHOCYTES # BLD AUTO: 14.4 % — SIGNIFICANT CHANGE UP (ref 13–44)
MCHC RBC-ENTMCNC: 29.2 PG — SIGNIFICANT CHANGE UP (ref 27–34)
MCHC RBC-ENTMCNC: 33.7 G/DL — SIGNIFICANT CHANGE UP (ref 32–36)
MCV RBC AUTO: 86.7 FL — SIGNIFICANT CHANGE UP (ref 80–100)
MONOCYTES # BLD AUTO: 0.6 K/UL — SIGNIFICANT CHANGE UP (ref 0–0.9)
MONOCYTES NFR BLD AUTO: 5.6 % — SIGNIFICANT CHANGE UP (ref 2–14)
NEUTROPHILS # BLD AUTO: 8.5 K/UL — HIGH (ref 1.8–7.4)
NEUTROPHILS NFR BLD AUTO: 78.8 % — HIGH (ref 43–77)
NITRITE UR QL STRIP: NORMAL
PH UR STRIP: 7
PLATELET # BLD AUTO: 329 K/UL — SIGNIFICANT CHANGE UP (ref 150–400)
PROT UR STRIP-MCNC: NORMAL
RBC # BLD: 3.12 M/UL — LOW (ref 3.8–5.2)
RBC # FLD: 13.9 % — SIGNIFICANT CHANGE UP (ref 10.3–14.5)
SP GR UR STRIP: 1.01
WBC # BLD: 10.8 K/UL — HIGH (ref 3.8–10.5)
WBC # FLD AUTO: 10.8 K/UL — HIGH (ref 3.8–10.5)

## 2023-06-08 PROCEDURE — 99214 OFFICE O/P EST MOD 30 MIN: CPT

## 2023-06-08 PROCEDURE — 0502F SUBSEQUENT PRENATAL CARE: CPT

## 2023-06-08 NOTE — CONSULT LETTER
[Dear  ___] : Dear  [unfilled], [Consult Letter:] : I had the pleasure of evaluating your patient, [unfilled]. [Please see my note below.] : Please see my note below. [Consult Closing:] : Thank you very much for allowing me to participate in the care of this patient.  If you have any questions, please do not hesitate to contact me. [Sincerely,] : Sincerely, [FreeTextEntry3] : Stephanie Brambila MD\par Medical Oncology/Hematology\par [facility] [place], [facility]\par \par \par [facility] [employer] at [facility] [place], operated by [facility]\par

## 2023-06-08 NOTE — ASSESSMENT
[FreeTextEntry1] : 22 year old F (, LMP 22,  MERVIN 23, planning for vaginal delivery) with a PMHx of h/o blood transfusions x 3 with previous pregnancy and kidney disease, here for evaluation of anemia with pregnancy. S/p admission to [facility] L&D ED on 23 - 23 d/t feeling sluggish, cold sweats, chills x 1 week and noted with Hb 7.7 g, S/p Venofer 200 mg on 23. \par \par 23 Ferritin elevated at ~500 likely secondary to recent venofer, %sat is 13, TIBC is elevated. B12 is 150 mcg/daily\par \par S/p Venofer on 23, notes tolerating well, denies side effects\par HGB improved to HGB 9.1 g from 8.8g on 23\par \par Plan\par Iron deficiency / anemia in pregnancy - proceed with venofer 200 mg weekly x 4, s/p C2 on 23, scheduled for 23, 23 and 23. \par Vitamin B12 deficiency - advised compliance to OTC B12 500 mcg daily. B12/folate ordered. \par Continue prenatal MV tabs. Follow up closely with OB Dr Hemphill \par Advised to call with concerns/symptoms \par Follow up in 1 month

## 2023-06-08 NOTE — HISTORY OF PRESENT ILLNESS
[de-identified] : RODRÍGUEZ MCNULTY is a 22 year old F (, LMP 22,  MERVIN 23, planning for vaginal delivery) with a PMHx of h/o blood transfusions x 3 with previous pregnancy, lithotripsy, infection due to Klebsiella pneumoniae, renal calculi, pyelonephritis, recurrent UTI, moderate hydronephrosis here for evaluation of anemia with pregnancy. S/p admission to [facility] L&D ED on 23 - 23 d/t feeling sluggish, cold sweats, chills x 1 week and noted with Hb 7.7 g, S/p Venofer 200 mg on 23. Patient was referred by GYN, Dr. Hemphill. \par \par Patient presents for initial visit\par Reports h/o kidney disease and ED visits q 1 -3 months d/t complications \par Reports regular follow up with Urologist\par \par PMHx: h/o blood transfusions, lithotripsy, infection due to Klebsiella pneumoniae, renal calculi, pyelonephritis, recurrent UTI, moderate hydronephrosis\par Socx: Never smoker \par \par Care Team: \par GYN, Dr. Hemphill\par \par  [de-identified] : Returns for follow up visit. \par \par S/p Venofer on 6/2/23, notes tolerating well, denies side effects\par Has no complaints, feels well\par Currently 37 weeks, due date on 6/27/23, plan [facility] vaginal birth at [facility], following up closely with OB Dr Hemphill \par Notes noncompliance with daily Vitamin B12, taking on average 3 times a week \par Notes compliance to prenatal vitamin\par Denies headache, dizziness, dyspnea\par Denies abdominal pain, blood in stool, bleeding\par Denies signs/symptoms of infection\par

## 2023-06-09 ENCOUNTER — APPOINTMENT (OUTPATIENT)
Age: 23
End: 2023-06-09

## 2023-06-09 ENCOUNTER — NON-APPOINTMENT (OUTPATIENT)
Age: 23
End: 2023-06-09

## 2023-06-09 LAB
ALBUMIN SERPL ELPH-MCNC: 3.6 G/DL
ALP BLD-CCNC: 183 U/L
ALT SERPL-CCNC: <5 U/L
ANION GAP SERPL CALC-SCNC: 15 MMOL/L
AST SERPL-CCNC: 9 U/L
BILIRUB SERPL-MCNC: 0.3 MG/DL
BUN SERPL-MCNC: 11 MG/DL
CALCIUM SERPL-MCNC: 8.9 MG/DL
CHLORIDE SERPL-SCNC: 102 MMOL/L
CO2 SERPL-SCNC: 21 MMOL/L
CREAT SERPL-MCNC: 0.89 MG/DL
EGFR: 93 ML/MIN/1.73M2
FERRITIN SERPL-MCNC: 107 NG/ML
FOLATE SERPL-MCNC: 3.5 NG/ML
GLUCOSE SERPL-MCNC: 90 MG/DL
IRON SATN MFR SERPL: 12 %
IRON SERPL-MCNC: 51 UG/DL
POTASSIUM SERPL-SCNC: 3.7 MMOL/L
PROT SERPL-MCNC: 6.9 G/DL
SODIUM SERPL-SCNC: 138 MMOL/L
TIBC SERPL-MCNC: 436 UG/DL
UIBC SERPL-MCNC: 384 UG/DL
VIT B12 SERPL-MCNC: 174 PG/ML

## 2023-06-12 ENCOUNTER — NON-APPOINTMENT (OUTPATIENT)
Age: 23
End: 2023-06-12

## 2023-06-12 ENCOUNTER — APPOINTMENT (OUTPATIENT)
Dept: MATERNAL FETAL MEDICINE | Facility: CLINIC | Age: 23
End: 2023-06-12

## 2023-06-12 RX ORDER — AMOXICILLIN AND CLAVULANATE POTASSIUM 500; 125 MG/1; MG/1
500-125 TABLET, FILM COATED ORAL
Qty: 14 | Refills: 0 | Status: ACTIVE | COMMUNITY
Start: 2023-06-12 | End: 1900-01-01

## 2023-06-13 LAB — BACTERIA UR CULT: ABNORMAL

## 2023-06-14 ENCOUNTER — INPATIENT (INPATIENT)
Facility: HOSPITAL | Age: 23
LOS: 2 days | Discharge: HOME | End: 2023-06-17
Attending: OBSTETRICS & GYNECOLOGY | Admitting: OBSTETRICS & GYNECOLOGY
Payer: COMMERCIAL

## 2023-06-14 ENCOUNTER — ASOB RESULT (OUTPATIENT)
Age: 23
End: 2023-06-14

## 2023-06-14 ENCOUNTER — NON-APPOINTMENT (OUTPATIENT)
Age: 23
End: 2023-06-14

## 2023-06-14 ENCOUNTER — APPOINTMENT (OUTPATIENT)
Dept: ANTEPARTUM | Facility: CLINIC | Age: 23
End: 2023-06-14
Payer: COMMERCIAL

## 2023-06-14 VITALS — HEART RATE: 94 BPM | DIASTOLIC BLOOD PRESSURE: 75 MMHG | SYSTOLIC BLOOD PRESSURE: 118 MMHG | TEMPERATURE: 98 F

## 2023-06-14 DIAGNOSIS — Z98.890 OTHER SPECIFIED POSTPROCEDURAL STATES: Chronic | ICD-10-CM

## 2023-06-14 LAB
ABO RH CONFIRMATION: SIGNIFICANT CHANGE UP
BASOPHILS # BLD AUTO: 0.02 K/UL — SIGNIFICANT CHANGE UP (ref 0–0.2)
BASOPHILS NFR BLD AUTO: 0.2 % — SIGNIFICANT CHANGE UP (ref 0–2)
BLD GP AB SCN SERPL QL: SIGNIFICANT CHANGE UP
EOSINOPHIL # BLD AUTO: 0.08 K/UL — SIGNIFICANT CHANGE UP (ref 0–0.5)
EOSINOPHIL NFR BLD AUTO: 1 % — SIGNIFICANT CHANGE UP (ref 0–6)
GLUCOSE BLDC GLUCOMTR-MCNC: 87 MG/DL — SIGNIFICANT CHANGE UP (ref 70–99)
GLUCOSE BLDC GLUCOMTR-MCNC: 98 MG/DL — SIGNIFICANT CHANGE UP (ref 70–99)
HCT VFR BLD CALC: 26.9 % — LOW (ref 34.5–45)
HGB BLD-MCNC: 8.8 G/DL — LOW (ref 11.5–15.5)
IMM GRANULOCYTES NFR BLD AUTO: 1 % — HIGH (ref 0–0.9)
LYMPHOCYTES # BLD AUTO: 1.41 K/UL — SIGNIFICANT CHANGE UP (ref 1–3.3)
LYMPHOCYTES # BLD AUTO: 16.9 % — SIGNIFICANT CHANGE UP (ref 13–44)
MCHC RBC-ENTMCNC: 28.5 PG — SIGNIFICANT CHANGE UP (ref 27–34)
MCHC RBC-ENTMCNC: 32.7 GM/DL — SIGNIFICANT CHANGE UP (ref 32–36)
MCV RBC AUTO: 87.1 FL — SIGNIFICANT CHANGE UP (ref 80–100)
MONOCYTES # BLD AUTO: 0.61 K/UL — SIGNIFICANT CHANGE UP (ref 0–0.9)
MONOCYTES NFR BLD AUTO: 7.3 % — SIGNIFICANT CHANGE UP (ref 2–14)
NEUTROPHILS # BLD AUTO: 6.13 K/UL — SIGNIFICANT CHANGE UP (ref 1.8–7.4)
NEUTROPHILS NFR BLD AUTO: 73.6 % — SIGNIFICANT CHANGE UP (ref 43–77)
PLATELET # BLD AUTO: 394 K/UL — SIGNIFICANT CHANGE UP (ref 150–400)
RBC # BLD: 3.09 M/UL — LOW (ref 3.8–5.2)
RBC # FLD: 15 % — HIGH (ref 10.3–14.5)
WBC # BLD: 8.33 K/UL — SIGNIFICANT CHANGE UP (ref 3.8–10.5)
WBC # FLD AUTO: 8.33 K/UL — SIGNIFICANT CHANGE UP (ref 3.8–10.5)

## 2023-06-14 PROCEDURE — 76816 OB US FOLLOW-UP PER FETUS: CPT

## 2023-06-14 PROCEDURE — 99222 1ST HOSP IP/OBS MODERATE 55: CPT

## 2023-06-14 PROCEDURE — 76819 FETAL BIOPHYS PROFIL W/O NST: CPT

## 2023-06-14 RX ORDER — SODIUM CHLORIDE 9 MG/ML
1000 INJECTION, SOLUTION INTRAVENOUS
Refills: 0 | Status: DISCONTINUED | OUTPATIENT
Start: 2023-06-14 | End: 2023-06-15

## 2023-06-14 RX ORDER — OXYTOCIN 10 UNIT/ML
333.33 VIAL (ML) INJECTION
Qty: 20 | Refills: 0 | Status: COMPLETED | OUTPATIENT
Start: 2023-06-14 | End: 2023-06-14

## 2023-06-14 RX ORDER — CHLORHEXIDINE GLUCONATE 213 G/1000ML
1 SOLUTION TOPICAL DAILY
Refills: 0 | Status: DISCONTINUED | OUTPATIENT
Start: 2023-06-14 | End: 2023-06-15

## 2023-06-14 RX ORDER — TRANEXAMIC ACID 100 MG/ML
1000 INJECTION, SOLUTION INTRAVENOUS ONCE
Refills: 0 | Status: COMPLETED | OUTPATIENT
Start: 2023-06-14 | End: 2023-06-15

## 2023-06-14 RX ORDER — CITRIC ACID/SODIUM CITRATE 300-500 MG
30 SOLUTION, ORAL ORAL ONCE
Refills: 0 | Status: DISCONTINUED | OUTPATIENT
Start: 2023-06-14 | End: 2023-06-15

## 2023-06-14 RX ADMIN — SODIUM CHLORIDE 125 MILLILITER(S): 9 INJECTION, SOLUTION INTRAVENOUS at 18:40

## 2023-06-14 RX ADMIN — SODIUM CHLORIDE 125 MILLILITER(S): 9 INJECTION, SOLUTION INTRAVENOUS at 16:36

## 2023-06-14 RX ADMIN — SODIUM CHLORIDE 125 MILLILITER(S): 9 INJECTION, SOLUTION INTRAVENOUS at 22:19

## 2023-06-14 NOTE — OB PROVIDER IHI INDUCTION/AUGMENTATION NOTE - NS_CHECKALL_OBGYN_ALL_OB
[de-identified] : Patient is a 36 y/o with PMHx of HTN, GERD, who presented for evaluation for ongoing GERD. Patient has family Hx of Colon Cancer in First Degree relative ( at a young age in upper 30's). Patient currently feeling well. At times he devolved intermittent abdominal pains described as twisting like. 
Order was written/H&P was completed/Contractions pattern was reviewed/FHR was reviewed/Induction / Augmentation was discussed

## 2023-06-14 NOTE — OB PROVIDER H&P - ASSESSMENT
A/P: 22y  at 38w1d GA admitted for IOL in the setting of oligo  -Admit to L&D  -Consent  -Admission labs  -IV fluids  -Fetus: Cat I tracing. Continuous toco and fetal monitoring.   -GBS: Negative, no GBS ppx required   -Analgesia: prn    Will discuss with Dr. Raymundo    A/P: 22y  at 38w1d GA admitted for IOL in the setting of oligo  -Admit to L&D  -Consent  -Admission labs  -IV fluids  -Fetus: Cat I tracing. Continuous toco and fetal monitoring.   -GBS: Negative, no GBS ppx required   -Analgesia: prn    Discussed with Dr. Hemphill

## 2023-06-14 NOTE — OB RN PATIENT PROFILE - TRANSFUSION HX COMMENT, PROFILE
3 blood transfusions with son during and after delivery 3 blood transfusions with first pregnancy--2 antepartum, 1 postpartum

## 2023-06-14 NOTE — OB RN PATIENT PROFILE - INSTRUCTED TO PATIENT: IF THE INFANT IS NOT PINK DURING SKIN TO SKIN, THE PARENTS IS TO SEEK ASSISTANCE IMMEDIATELY.
Patient reports that she moved to [place] and it is difficult to get to our office.     Patient stated that she would like to follow up with Dr. Elder in regards to blood pressure.     Appointment scheduled.    Statement Selected

## 2023-06-14 NOTE — OB PROVIDER H&P - HISTORY OF PRESENT ILLNESS
22y  at 38w1d GA  who presents to L&D for IOL in the setting of oligohydramnios (GUANACO 3.9)    MERVIN: 2023  LMP: 2022  Prenatal course is significant for:  Anemia s/p venofer infusions     POB:  G1: 2016 eTOP  G2: 2019  c/b sepsis  PGYN: -fibroids, -ovarian cysts, denies STD hx, denies abnormal PAPs   PMH: Renal stones (hx of sepsis before this pregnancy, on antibiotic supression?  PSH: lithotripsy, stent placement and removal  SH: Denies EtOH, tobacco and illicit drug use during this pregnancy; feels safe at home   Meds: PNVs  Allergies: NKDA    Sono: vertex, anterior   EFW: 2241

## 2023-06-14 NOTE — OB RN PATIENT PROFILE - NS_OBGYNHISTORY_OBGYN_ALL_OB_FT
X1 in   SAB X1   X1 in 2019--septic x 2 at 22 and 32 weeks, ventilated. Blood transfusion x 3 (2 antepartum, 1 postpartum)  SAB X1 2016

## 2023-06-14 NOTE — OB RN PATIENT PROFILE - FALL HARM RISK - ATTEMPT OOB
Assessment   1  Hypertension (401 9) (I10)   2  Impaired fasting glucose (790 21) (R73 01)   3  Insomnia (780 52) (G47 00)   4  Occlusion and stenosis of carotid artery (433 10) (I65 29)   5  Hyperlipidemia (272 4) (E78 5)    Plan   Health Maintenance    · *VB - Fall Risk Assessment  (Dx Z13 89 Screen for Neurologic Disorder);    Status:Complete;   Done: 25XEI8603 09:44AM   · *VB-Depression Screening; Status:Complete;   Done: 42XDG6712 09:44AM  Hyperlipidemia    · (1) LIPID PANEL FASTING W DIRECT LDL REFLEX; Status:Active; Requested    for:65Xvm7769;   Hypertension    · AmLODIPine Besylate 5 MG Oral Tablet; TAKE 1 TAB TWICE A DAY GENERIC    NORVASC   · Metoprolol Tartrate 50 MG Oral Tablet; take 1/2 tab twice a day  Hypertension, Impaired fasting glucose, Insomnia, Occlusion and stenosis of carotid    artery    · (1) CBC/PLT/DIFF; Status:Active; Requested for:39Wdw8205;    · (1) COMPREHENSIVE METABOLIC PANEL; Status:Active; Requested for:01Jul2018;    · (1) HEMOGLOBIN A1C; Status:Active; Requested for:01Jul2018;    · (1) TSH WITH FT4 REFLEX; Status:Active; Requested for:01Jul2018; Insomnia    · Temazepam 15 MG Oral Capsule; TAKE 1 OR 2 CAPS AT BEDTIME AS    NEEDED FOR SLEEP  Occlusion and stenosis of carotid artery    · 1 - Babs SHIN, Tab Hogan  (Vascular Surgery) Co-Management  *  Status: Active  Requested    for: 29JFS6927  Care Summary provided  : Yes  Unlinked    · Aspirin 81 MG TABS; TAKE 1 TABLET DAILY    Discussion/Summary      Reviewed lab in 1/2018 6 2 ok ok current meds  pt to follow vascular regularly  stable  flu shot  pneumovax or PCV13  Dexa scan   precautions  in 6 months with labs  Possible side effects of new medications were reviewed with the patient/guardian today  The treatment plan was reviewed with the patient/guardian   The patient/guardian understands and agrees with the treatment plan       Self Referrals: No      Chief Complaint   Patient is here for a follow up for hyperlipidemia and hypertension      Advance Directives   Advance Directive [facility]:      NO - Patient does not have an advance health care directive  History of Present Illness   Pt is here by herself  Pt feels leg pain got better after she stopped atorvastatin  Pt does not want to take statin any more  artery stenosis---s/p left carotid endarterectomy 10 years ago  No symptoms now  She is on baby ASA daily  Sometimes she had bruise on her legs  FU vascular before  temazepam occasionally which helped  amlodipine and metoprolol  Does not check BP at home  Pt denies dizziness, lightheaded etc   lives in a senior apartment by herself  Does all ADL's  Still drive  She sees her son everyday who lives one block away  everyday  recent falls  depression  living will per pt  No POA per pt  She prefers DNR  The patient presents for follow-up of essential hypertension  The patient states she has been doing well with her blood pressure control since the last visit  Comorbid Illnesses: peripheral vascular disease  Symptoms: The patient is currently asymptomatic  Home monitoring: The patient is not checking blood pressure at home  Medications: the patient is adherent with her medication regimen  -- She denies medication side effects  Review of Systems        Constitutional: No fever, no chills, feels well, no tiredness, no recent weight gain or weight loss  ENT: no complaints of earache, no loss of hearing, no nose bleeds, no nasal discharge, no sore throat, no hoarseness  Cardiovascular: No complaints of slow heart rate, no fast heart rate, no chest pain, no palpitations, no leg claudication, no lower extremity edema  Respiratory: No complaints of shortness of breath, no wheezing, no cough, no SOB on exertion, no orthopnea, no PND  Gastrointestinal: No complaints of abdominal pain, no constipation, no nausea or vomiting, no diarrhea, no bloody stools        Musculoskeletal: No complaints of arthralgias, no myalgias, no joint swelling or stiffness, no limb pain or swelling  Preventive Quality 65 and Older: The patient is currently asymptomatic Symptoms Include: no recent fall       Active Problems   1  Hyperlipidemia (272 4) (E78 5)   2  Hypertension (401 9) (I10)   3  Impaired fasting glucose (790 21) (R73 01)   4  Insomnia (780 52) (G47 00)   5  Medicare annual wellness visit, initial (V70 0) (Z00 00)   6  Occlusion and stenosis of carotid artery (433 10) (I65 29)   7  Peripheral vascular disease (443 9) (I73 9)    Past Medical History   1  History of Allergic rhinitis (477 9) (J30 9)   2  History of Bladder Cancer (V10 51)   3  History of Colon Cancer (V10 05)   4  History of Colon Cancer (V10 05)   5  History of Grieving (Symptom)   6  History of contact dermatitis (V13 3) (Z87 2)   7  History of diverticulitis of colon (V12 79) (Z87 19)   8  History of transient cerebral ischemia (V12 54) (Z86 73)   9  History of Lyme disease (088 81) (A69 20)   10  History of Microscopic hematuria (599 72) (R31 29)   11  History of Neck pain (723 1) (M54 2)   12  History of Neoplasm of bladder (239 4) (D49 4)   13  Normal delivery (650) (O80,Z37 9)   14  History of Osteoarthritis (715 90) (M19 90)   15  Personal history of subarachnoid hemorrhage (V12 59) (Z86 79)   16  Personal history of subdural hematoma (V12 59) (Z86 79)   17  History of Post Small Intestine Resection (V45 89)    Surgical History   1  History of Cataract Surgery   2  History of Cholecystectomy   3  History of Cystoscopy With Insertion Of Ureteral Stent Left   4  History of Oral Surgery   5  History of Partial Colectomy - Sigmoid    Family History   Mother    1  Family history of    2  Family history of Respiratory Failure  Father    3  Family history of    4  Family history of Gallbladder Disease  Brother    5  Family history of   Maternal Grandmother    6  Family history of   Paternal Grandmother    9  Family history of   Maternal Grandfather    8  Family history of   Paternal Grandfather    5  Family history of     Social History    · Denied: History of Alcohol   · Former smoker (V15 82) (Q36 164)   · Retired From Work    Current Meds    1  AmLODIPine Besylate 5 MG Oral Tablet; TAKE 1 TAB TWICE A DAY GENERIC     NORVASC; Therapy: 38REP9359 to ([id number])  Requested for: 97KAC4948; Last     Rx:36Deq1123 Ordered   2  Aspirin 81 MG TABS; TAKE 1 TABLET DAILY; Therapy: (Recorded:2018) to Recorded   3  Metoprolol Tartrate 50 MG Oral Tablet; take 1/2 tab twice a day; Therapy: 41OUY4970 to (Evaluate:2018)  Requested for: 28LTV4963; Last     Rx:89Hso4843 Ordered   4  Temazepam 15 MG Oral Capsule; TAKE 1 OR 2 CAPS AT BEDTIME AS NEEDED FOR     SLEEP; Therapy: 62LFY5068 to )  Requested for: 18CLN3394; Last     Rx:2018 Ordered    Allergies   1  Codeine Derivatives   2  Aleve CAPS    Vitals   Vital Signs    Recorded: 41WSP3445 09:14AM   Temperature 97 5 F   Heart Rate 60   Respiration 16   Systolic 681   Diastolic 60   Height 5 ft    Weight 110 lb    BMI Calculated 21 48   BSA Calculated 1 45     Physical Exam        Constitutional      General appearance: No acute distress, well appearing and well nourished  Pulmonary      Respiratory effort: No increased work of breathing or signs of respiratory distress  Auscultation of lungs: Clear to auscultation  Cardiovascular      Auscultation of heart: Normal rate and rhythm, normal S1 and S2, without murmurs  Examination of extremities for edema and/or varicosities: Normal        Carotid pulses: Normal        Abdomen      Abdomen: Non-tender, no masses  Liver and spleen: No hepatomegaly or splenomegaly  Lymphatic      Palpation of lymph nodes in neck: No lymphadenopathy         Musculoskeletal      Gait and station: Normal           Results/Data   (1) COMPREHENSIVE METABOLIC PANEL 90PCY6903 82:76AO Bundy Rho   Test ordered by: Ivdavid Gun      TW Order Number: [id number]      Test Name Result Flag Reference   SODIUM 142 mmol/L  136-145   POTASSIUM 3 7 mmol/L  3 5-5 3   CHLORIDE 106 mmol/L  100-108   CARBON DIOXIDE 27 mmol/L  21-32   ANION GAP (CALC) 9 mmol/L  4-13   BLOOD UREA NITROGEN 13 mg/dL  5-25   CREATININE 1 16 mg/dL  0 60-1 30   Standardized to IDMS reference method   CALCIUM 9 0 mg/dL  8 3-10 1   BILI, TOTAL 0 68 mg/dL  0 20-1 00   ALK PHOSPHATAS 72 U/L     ALT (SGPT) 10 U/L L 12-78   Specimen collection should occur prior to Sulfasalazine and/or Sulfapyridine administration due to the potential for falsely depressed results  AST(SGOT) 8 U/L  5-45   Specimen collection should occur prior to Sulfasalazine administration due to the potential for falsely depressed results  ALBUMIN 3 5 g/dL  3 5-5 0   TOTAL PROTEIN 8 0 g/dL  6 4-8 2   eGFR 42 ml/min/1 73sq [facility] Disease Education Program recommendations are as follows:     GFR calculation is accurate only with a steady state creatinine     Chronic Kidney disease less than 60 ml/min/1 73 sq  meters     Kidney failure less than 15 ml/min/1 73 sq  meters  GLUCOSE FASTING 122 mg/dL H 65-99   Specimen collection should occur prior to Sulfasalazine administration due to the potential for falsely depressed results  Specimen collection should occur prior to Sulfapyridine administration due to the potential for falsely elevated results  (1) HEMOGLOBIN A1C 36FQT7644 07:47AM Bundy Rho   Test ordered by: Jing Gun      PAULETTE Order Number: [id number]      Test Name Result Flag Reference   HEMOGLOBIN A1C 6 2 %  4 2-6 3   EST  AVG  GLUCOSE 131 mg/dl        Signatures    Electronically signed by :  Kiki Cruz MD; Juan 10 2018  9:44AM EST                       (Author) No

## 2023-06-14 NOTE — OB PROVIDER H&P - NSHPPHYSICALEXAM_GEN_ALL_CORE
T(C): 36.8 (06-14-23 @ 16:09), Max: 36.8 (06-14-23 @ 16:09)  HR: 94 (06-14-23 @ 16:09) (94 - 94)  BP: 118/75 (06-14-23 @ 16:09) (118/75 - 118/75)  RR: 16 (06-14-23 @ 16:09) (16 - 16)      Gen: NAD, well-appearing, AAOx3   Abd: Soft, gravid  Ext: non-tender, non-edematous  SVE:    FHT: baseline 120, moderate variability, +accels, -decels   [place]: No contractions T(C): 36.8 (06-14-23 @ 16:09), Max: 36.8 (06-14-23 @ 16:09)  HR: 94 (06-14-23 @ 16:09) (94 - 94)  BP: 118/75 (06-14-23 @ 16:09) (118/75 - 118/75)  RR: 16 (06-14-23 @ 16:09) (16 - 16)      Gen: NAD, well-appearing, AAOx3   Abd: Soft, gravid  Ext: non-tender, non-edematous  SVE: 0/70/-3  FHT: baseline 120, moderate variability, +accels, -decels   [place]: No contractions

## 2023-06-14 NOTE — OB PROVIDER H&P - PRO INTERPRETER NEED 2
Received RF request from patient's Mom     Last seen: 12/14/20  RTC:   Cancel: none  No-show: none  Next appt: none scheduled     Medication requested: hydrOXYzine (VISTARIL) 25 MG capsule  Directions: Take 1-2 capsules (25-50 mg) by mouth daily as needed for itching or anxiety  Qty: 30  Last Rx written 12/14/20 for 30 caps with 3 rf      Per outside meds tab, this particular Rx has been filled twice, on Dec 14 and Jan 18. There are 2 refills remaining. Will request that Mom contact the pharmacy directly to request a refill.             
English

## 2023-06-14 NOTE — OB RN PATIENT PROFILE - HEALTH/HEALTHCARE ANXIETIES, PROFILE
Discharge instructions reviewed, plan of care verbalized.    
Patient verbally understand discharge instruction rx sent to pharmacy advise to follow up with PCP no further question .Lgdaksha XAVIER  
Subjective   Patient ID: Scotty is a 54 year old male.    Chief Complaint   Patient presents with   • Congestion     sinus x 1 week     Patient   With   Productive  cough  For the past   week        Patient's medications, allergies, past medical, surgical, social and family histories were reviewed and updated as appropriate.  Patient's medications, allergies, past medical, surgical, and social history  were reviewed and updated as appropriate.    Review of Systems   Constitutional: Negative for chills and fever.   All other systems reviewed and are negative.      Objective   Physical Exam   Constitutional: He is oriented to person, place, and time. He appears well-developed and well-nourished. No distress.   HENT:   Head: Normocephalic and atraumatic.   Right Ear: External ear normal.   Left Ear: External ear normal.   Mild  Nasal congestion oral pharynx   injected   Eyes: Conjunctivae and EOM are normal. Pupils are equal, round, and reactive to light.   Neck: Normal range of motion. Neck supple.   Cardiovascular: Normal rate and regular rhythm.   Pulmonary/Chest: Effort normal and breath sounds normal. No respiratory distress. He has no wheezes.   Abdominal: Soft. Bowel sounds are normal.   Musculoskeletal: Normal range of motion.   Neurological: He is alert and oriented to person, place, and time.   Skin: Skin is warm and dry.   Psychiatric: He has a normal mood and affect.   Nursing note and vitals reviewed.      Assessment   Problem List Items Addressed This Visit     None      Visit Diagnoses     Productive cough    -  Primary    Relevant Medications    azithromycin (ZITHROMAX Z-CUONG) 250 MG tablet    benzonatate (TESSALON PERLES) 100 MG capsule          Thank you for visiting [facility].  To establish care with an [facility] Provider, please call [phone] ([phone]).  
none

## 2023-06-15 ENCOUNTER — APPOINTMENT (OUTPATIENT)
Dept: OBGYN | Facility: CLINIC | Age: 23
End: 2023-06-15

## 2023-06-15 ENCOUNTER — TRANSCRIPTION ENCOUNTER (OUTPATIENT)
Age: 23
End: 2023-06-15

## 2023-06-15 ENCOUNTER — RESULT REVIEW (OUTPATIENT)
Age: 23
End: 2023-06-15

## 2023-06-15 ENCOUNTER — NON-APPOINTMENT (OUTPATIENT)
Age: 23
End: 2023-06-15

## 2023-06-15 DIAGNOSIS — Z34.93 ENCOUNTER FOR SUPERVISION OF NORMAL PREGNANCY, UNSPECIFIED, THIRD TRIMESTER: ICD-10-CM

## 2023-06-15 DIAGNOSIS — O99.810 ABNORMAL GLUCOSE COMPLICATING PREGNANCY: ICD-10-CM

## 2023-06-15 DIAGNOSIS — O99.013 ANEMIA COMPLICATING PREGNANCY, THIRD TRIMESTER: ICD-10-CM

## 2023-06-15 LAB
COVID-19 SPIKE DOMAIN AB INTERP: POSITIVE
COVID-19 SPIKE DOMAIN ANTIBODY RESULT: 149 U/ML — HIGH
GLUCOSE BLDC GLUCOMTR-MCNC: 113 MG/DL — HIGH (ref 70–99)
GLUCOSE BLDC GLUCOMTR-MCNC: 80 MG/DL — SIGNIFICANT CHANGE UP (ref 70–99)
HCT VFR BLD CALC: 25.7 % — LOW (ref 34.5–45)
HGB BLD-MCNC: 8.4 G/DL — LOW (ref 11.5–15.5)
SARS-COV-2 IGG+IGM SERPL QL IA: 149 U/ML — HIGH
SARS-COV-2 IGG+IGM SERPL QL IA: POSITIVE
T PALLIDUM AB TITR SER: NEGATIVE — SIGNIFICANT CHANGE UP

## 2023-06-15 PROCEDURE — 59410 OBSTETRICAL CARE: CPT | Mod: U7

## 2023-06-15 RX ORDER — OXYCODONE HYDROCHLORIDE 5 MG/1
5 TABLET ORAL
Refills: 0 | Status: DISCONTINUED | OUTPATIENT
Start: 2023-06-15 | End: 2023-06-17

## 2023-06-15 RX ORDER — PRAMOXINE HYDROCHLORIDE 150 MG/15G
1 AEROSOL, FOAM RECTAL EVERY 4 HOURS
Refills: 0 | Status: DISCONTINUED | OUTPATIENT
Start: 2023-06-15 | End: 2023-06-17

## 2023-06-15 RX ORDER — MAGNESIUM HYDROXIDE 400 MG/1
30 TABLET, CHEWABLE ORAL
Refills: 0 | Status: DISCONTINUED | OUTPATIENT
Start: 2023-06-15 | End: 2023-06-17

## 2023-06-15 RX ORDER — KETOROLAC TROMETHAMINE 30 MG/ML
30 SYRINGE (ML) INJECTION ONCE
Refills: 0 | Status: DISCONTINUED | OUTPATIENT
Start: 2023-06-15 | End: 2023-06-15

## 2023-06-15 RX ORDER — SODIUM CHLORIDE 9 MG/ML
3 INJECTION INTRAMUSCULAR; INTRAVENOUS; SUBCUTANEOUS EVERY 8 HOURS
Refills: 0 | Status: DISCONTINUED | OUTPATIENT
Start: 2023-06-15 | End: 2023-06-17

## 2023-06-15 RX ORDER — OXYCODONE HYDROCHLORIDE 5 MG/1
5 TABLET ORAL ONCE
Refills: 0 | Status: DISCONTINUED | OUTPATIENT
Start: 2023-06-15 | End: 2023-06-17

## 2023-06-15 RX ORDER — DIPHENHYDRAMINE HCL 50 MG
25 CAPSULE ORAL EVERY 6 HOURS
Refills: 0 | Status: DISCONTINUED | OUTPATIENT
Start: 2023-06-15 | End: 2023-06-17

## 2023-06-15 RX ORDER — AER TRAVELER 0.5 G/1
1 SOLUTION RECTAL; TOPICAL EVERY 4 HOURS
Refills: 0 | Status: DISCONTINUED | OUTPATIENT
Start: 2023-06-15 | End: 2023-06-17

## 2023-06-15 RX ORDER — FOLIC ACID 0.8 MG
1 TABLET ORAL DAILY
Refills: 0 | Status: DISCONTINUED | OUTPATIENT
Start: 2023-06-15 | End: 2023-06-17

## 2023-06-15 RX ORDER — IBUPROFEN 200 MG
600 TABLET ORAL EVERY 6 HOURS
Refills: 0 | Status: DISCONTINUED | OUTPATIENT
Start: 2023-06-15 | End: 2023-06-17

## 2023-06-15 RX ORDER — BENZOCAINE 10 %
1 GEL (GRAM) MUCOUS MEMBRANE EVERY 6 HOURS
Refills: 0 | Status: DISCONTINUED | OUTPATIENT
Start: 2023-06-15 | End: 2023-06-17

## 2023-06-15 RX ORDER — OXYTOCIN 10 UNIT/ML
41.67 VIAL (ML) INJECTION
Qty: 20 | Refills: 0 | Status: DISCONTINUED | OUTPATIENT
Start: 2023-06-15 | End: 2023-06-17

## 2023-06-15 RX ORDER — ASCORBIC ACID 60 MG
500 TABLET,CHEWABLE ORAL DAILY
Refills: 0 | Status: DISCONTINUED | OUTPATIENT
Start: 2023-06-15 | End: 2023-06-17

## 2023-06-15 RX ORDER — FERROUS SULFATE 325(65) MG
325 TABLET ORAL DAILY
Refills: 0 | Status: DISCONTINUED | OUTPATIENT
Start: 2023-06-15 | End: 2023-06-17

## 2023-06-15 RX ORDER — HYDROCORTISONE 1 %
1 OINTMENT (GRAM) TOPICAL EVERY 6 HOURS
Refills: 0 | Status: DISCONTINUED | OUTPATIENT
Start: 2023-06-15 | End: 2023-06-17

## 2023-06-15 RX ORDER — DIBUCAINE 1 %
1 OINTMENT (GRAM) RECTAL EVERY 6 HOURS
Refills: 0 | Status: DISCONTINUED | OUTPATIENT
Start: 2023-06-15 | End: 2023-06-17

## 2023-06-15 RX ORDER — LANOLIN
1 OINTMENT (GRAM) TOPICAL EVERY 6 HOURS
Refills: 0 | Status: DISCONTINUED | OUTPATIENT
Start: 2023-06-15 | End: 2023-06-17

## 2023-06-15 RX ORDER — IBUPROFEN 200 MG
600 TABLET ORAL EVERY 6 HOURS
Refills: 0 | Status: COMPLETED | OUTPATIENT
Start: 2023-06-15 | End: 2024-05-13

## 2023-06-15 RX ORDER — SIMETHICONE 80 MG/1
80 TABLET, CHEWABLE ORAL EVERY 4 HOURS
Refills: 0 | Status: DISCONTINUED | OUTPATIENT
Start: 2023-06-15 | End: 2023-06-17

## 2023-06-15 RX ORDER — ACETAMINOPHEN 500 MG
975 TABLET ORAL
Refills: 0 | Status: DISCONTINUED | OUTPATIENT
Start: 2023-06-15 | End: 2023-06-17

## 2023-06-15 RX ORDER — TETANUS TOXOID, REDUCED DIPHTHERIA TOXOID AND ACELLULAR PERTUSSIS VACCINE, ADSORBED 5; 2.5; 8; 8; 2.5 [IU]/.5ML; [IU]/.5ML; UG/.5ML; UG/.5ML; UG/.5ML
0.5 SUSPENSION INTRAMUSCULAR ONCE
Refills: 0 | Status: DISCONTINUED | OUTPATIENT
Start: 2023-06-15 | End: 2023-06-17

## 2023-06-15 RX ADMIN — Medication 1 TABLET(S): at 12:41

## 2023-06-15 RX ADMIN — Medication 500 MILLIGRAM(S): at 12:42

## 2023-06-15 RX ADMIN — Medication 600 MILLIGRAM(S): at 23:39

## 2023-06-15 RX ADMIN — TRANEXAMIC ACID 220 MILLIGRAM(S): 100 INJECTION, SOLUTION INTRAVENOUS at 07:29

## 2023-06-15 RX ADMIN — Medication 600 MILLIGRAM(S): at 17:54

## 2023-06-15 RX ADMIN — Medication 325 MILLIGRAM(S): at 12:41

## 2023-06-15 RX ADMIN — Medication 30 MILLIGRAM(S): at 08:32

## 2023-06-15 RX ADMIN — Medication 1000 MILLIUNIT(S)/MIN: at 07:46

## 2023-06-15 RX ADMIN — Medication 600 MILLIGRAM(S): at 12:41

## 2023-06-15 RX ADMIN — Medication 975 MILLIGRAM(S): at 20:18

## 2023-06-15 RX ADMIN — Medication 1 MILLIGRAM(S): at 12:42

## 2023-06-15 NOTE — DISCHARGE NOTE OB - PATIENT PORTAL LINK FT
You can access the FollowMyHealth Patient Portal offered by [facility] by registering at the following website: [email or web address]. By joining [employer]’s FollowMyHealth portal, you will also be able to view your health information using other applications (apps) compatible with our system.

## 2023-06-15 NOTE — DISCHARGE NOTE OB - HOSPITAL COURSE
She is a  who presented at 38w2d GAfor IOL for oligohydramnios and had a normal delivery. She had a normal postpartum course and was discharged home in stable condition.

## 2023-06-15 NOTE — OB PROVIDER DELIVERY SUMMARY - NSLOWPPHRISK_OBGYN_A_OB
No previous uterine incision/[place] Pregnancy/Less than or equal to 4 previous vaginal births/No known bleeding disorder/No history of postpartum hemorrhage/No other PPH risks indicated

## 2023-06-15 NOTE — OB RN DELIVERY SUMMARY - NS_SEPSISRSKCALC_OBGYN_ALL_OB_FT
EOS calculated successfully. EOS Risk Factor: 0.5/1000 live births ([facility] national incidence); GA=38w2d; Temp=98.24; ROM=3.283; GBS='Negative'; Antibiotics='No antibiotics or any antibiotics < 2 hrs prior to birth'

## 2023-06-15 NOTE — DISCHARGE NOTE OB - NS MD DC FALL RISK RISK
For information on Fall & Injury Prevention, visit: [email or web address] OR  [email or web address] OR  https://www.cdc.gov/steadi/patient.html

## 2023-06-15 NOTE — OB PROVIDER LABOR PROGRESS NOTE - NS_SUBJECTIVE/OBJECTIVE_OBGYN_ALL_OB_FT
Patient re-examined at bedside. No current complaints.
Patient was reexamined at bedside and ruptured scant bloody fluid. She is resting comfortably sp epidural
Patient re-examined at bedside. Reports feeling more pain with contractions. Requesting an epidural.

## 2023-06-15 NOTE — OB PROVIDER DELIVERY SUMMARY - NSPROVIDERDELIVERYNOTE_OBGYN_ALL_OB_FT
Viable  Male @ 07:42  Apgar  9/9   weight  6 lbs. 8 oz.,, 2940 GM  Second degree laceration  repaired  2-0  vicryl x 2,, 2-0 Chromic  x 1   Placenta intact to path   cc

## 2023-06-15 NOTE — DISCHARGE NOTE OB - MATERIALS PROVIDED
Breastfeeding Mother’s Support Group Information/Guide to Postpartum Care/[facility] Hearing Screen Program/Back To Sleep Handout/Shaken Baby Prevention Handout/Breastfeeding Guide and Packet/Birth Certificate Instructions/Tdap Vaccination (VIS Pub Date: January 24, 2012)

## 2023-06-15 NOTE — DISCHARGE NOTE OB - MEDICATION SUMMARY - MEDICATIONS TO TAKE
Patient would benefit from a comprehensive speech-language evaluation given findings of reduced articulatory precision upon informal screening I will START or STAY ON the medications listed below when I get home from the hospital:    ibuprofen 600 mg oral tablet  -- 1 tab(s) by mouth every 6 hours MDD: 4  -- Indication: For pain    acetaminophen 325 mg oral tablet  -- 3 tab(s) by mouth every 6 hours MDD: 4  -- Indication: For pain

## 2023-06-15 NOTE — DISCHARGE NOTE OB - CARE PROVIDER_API CALL
Leilani Hemphill  Obstetrics and Gynecology  [address] [place] [id number]  Phone: [phone]  Fax: [phone]  Established Patient  Follow Up Time: 2 weeks

## 2023-06-15 NOTE — OB PROVIDER LABOR PROGRESS NOTE - NS_OBIHIFHRDETAILS_OBGYN_ALL_OB_FT
baseline FHR 120s, moderate variability, +accels, -decels
baseline FHR 120s, moderate variability, +accels, intermittent variable decels
110/mod joe/+accels/-decels

## 2023-06-15 NOTE — OB PROVIDER DELIVERY SUMMARY - NSSELHIDDEN_OBGYN_ALL_OB_FT
[NS_DeliveryAttending1_OBGYN_ALL_OB_FT:[id number]==],[NS_DeliveryRN_OBGYN_ALL_OB_FT:[id number]=]

## 2023-06-15 NOTE — DISCHARGE NOTE OB - CARE PLAN
initiation of breastfeeding/breast milk feeding Principal Discharge DX:	 (normal spontaneous vaginal delivery)  Assessment and plan of treatment:	Please call your provider in 4-6 weeks to schedule your post partum visit. Take medications as directed, regular diet, activity as tolerated. Exclusive breast feeding for the first 6 months is recommended. Nothing per vagina for 6 weeks (incl. sex, douching, etc). If you have additional concerns, please inform your provider.   1

## 2023-06-15 NOTE — OB PROVIDER LABOR PROGRESS NOTE - ASSESSMENT
Plan  -Start Pitocin if ctx spaced out
-VSS  -FHT cat II. Patient repositioned. Will continue to reposition as needed for tracing resuscitation.
-VSS  -Patient recently received second dose of buccal cytotec.  -Will notify anesthesia for epidural.  -Will re-examine after epidural and consider AROM.

## 2023-06-15 NOTE — OB RN DELIVERY SUMMARY - NSSELHIDDEN_OBGYN_ALL_OB_FT
[NS_DeliveryAttending1_OBGYN_ALL_OB_FT:[id number]==],[NS_DeliveryRN_OBGYN_ALL_OB_FT:[id number]=]

## 2023-06-16 ENCOUNTER — APPOINTMENT (OUTPATIENT)
Age: 23
End: 2023-06-16

## 2023-06-16 VITALS
TEMPERATURE: 98 F | SYSTOLIC BLOOD PRESSURE: 116 MMHG | RESPIRATION RATE: 18 BRPM | DIASTOLIC BLOOD PRESSURE: 78 MMHG | OXYGEN SATURATION: 97 % | HEART RATE: 80 BPM

## 2023-06-16 LAB
HCT VFR BLD CALC: 23.9 % — LOW (ref 34.5–45)
HGB BLD-MCNC: 7.6 G/DL — LOW (ref 11.5–15.5)

## 2023-06-16 RX ORDER — ACETAMINOPHEN 500 MG
3 TABLET ORAL
Qty: 180 | Refills: 0
Start: 2023-06-16 | End: 2023-06-30

## 2023-06-16 RX ORDER — IBUPROFEN 200 MG
1 TABLET ORAL
Qty: 60 | Refills: 0
Start: 2023-06-16 | End: 2023-06-30

## 2023-06-16 RX ORDER — IRON SUCROSE 20 MG/ML
300 INJECTION, SOLUTION INTRAVENOUS ONCE
Refills: 0 | Status: COMPLETED | OUTPATIENT
Start: 2023-06-16 | End: 2023-06-16

## 2023-06-16 RX ADMIN — Medication 600 MILLIGRAM(S): at 05:59

## 2023-06-16 RX ADMIN — IRON SUCROSE 176.67 MILLIGRAM(S): 20 INJECTION, SOLUTION INTRAVENOUS at 06:45

## 2023-06-16 RX ADMIN — Medication 1 MILLIGRAM(S): at 13:12

## 2023-06-16 RX ADMIN — Medication 1 TABLET(S): at 13:12

## 2023-06-16 RX ADMIN — Medication 500 MILLIGRAM(S): at 13:12

## 2023-06-16 RX ADMIN — SODIUM CHLORIDE 3 MILLILITER(S): 9 INJECTION INTRAMUSCULAR; INTRAVENOUS; SUBCUTANEOUS at 06:05

## 2023-06-16 RX ADMIN — Medication 325 MILLIGRAM(S): at 13:12

## 2023-06-16 RX ADMIN — Medication 600 MILLIGRAM(S): at 13:12

## 2023-06-16 RX ADMIN — Medication 975 MILLIGRAM(S): at 08:55

## 2023-06-16 RX ADMIN — Medication 975 MILLIGRAM(S): at 20:41

## 2023-06-16 RX ADMIN — SODIUM CHLORIDE 3 MILLILITER(S): 9 INJECTION INTRAMUSCULAR; INTRAVENOUS; SUBCUTANEOUS at 21:12

## 2023-06-16 RX ADMIN — Medication 600 MILLIGRAM(S): at 18:06

## 2023-06-16 RX ADMIN — Medication 600 MILLIGRAM(S): at 23:17

## 2023-06-16 RX ADMIN — Medication 975 MILLIGRAM(S): at 15:49

## 2023-06-16 NOTE — PROGRESS NOTE ADULT - ATTENDING COMMENTS
PPD#1 from   Desires circ, will do today  Possible DC this afternoon  Getting IV iron infusion for anemia, chronic and blood loss, she is in stable condition  Maria Antonia Saxena

## 2023-06-17 PROCEDURE — 82962 GLUCOSE BLOOD TEST: CPT

## 2023-06-17 PROCEDURE — 88307 TISSUE EXAM BY PATHOLOGIST: CPT

## 2023-06-17 PROCEDURE — 85014 HEMATOCRIT: CPT

## 2023-06-17 PROCEDURE — 86901 BLOOD TYPING SEROLOGIC RH(D): CPT

## 2023-06-17 PROCEDURE — 85025 COMPLETE CBC W/AUTO DIFF WBC: CPT

## 2023-06-17 PROCEDURE — 86900 BLOOD TYPING SEROLOGIC ABO: CPT

## 2023-06-17 PROCEDURE — 86780 TREPONEMA PALLIDUM: CPT

## 2023-06-17 PROCEDURE — 59050 FETAL MONITOR W/REPORT: CPT

## 2023-06-17 PROCEDURE — 85018 HEMOGLOBIN: CPT

## 2023-06-17 PROCEDURE — 86769 SARS-COV-2 COVID-19 ANTIBODY: CPT

## 2023-06-17 PROCEDURE — 86850 RBC ANTIBODY SCREEN: CPT

## 2023-06-17 PROCEDURE — 36415 COLL VENOUS BLD VENIPUNCTURE: CPT

## 2023-06-17 RX ADMIN — Medication 975 MILLIGRAM(S): at 09:27

## 2023-06-17 RX ADMIN — SODIUM CHLORIDE 3 MILLILITER(S): 9 INJECTION INTRAMUSCULAR; INTRAVENOUS; SUBCUTANEOUS at 06:13

## 2023-06-17 NOTE — PROGRESS NOTE ADULT - SUBJECTIVE AND OBJECTIVE BOX
PPD # !    Patient resting comfortably in Nad   Afebrile VSS  Abdomen  Soft  not tender  Fundus  firm  Extrem  No Homans  Lochia nl    WBC 8.33  H/H 8.8/26.9  Platel  394  VDRL Neg  B +    H/H 8.4/25.7    H/H 7.6/23.9    Patient s/p   chronic anemia  with slight blood loss  Continue post partum care
PPD # 2    patient resting comfortably in NAD  afebrile  VSS  Abdomen  Soft  not tender  Fundus  firm  Extrem  No Homans     H/H 7.6/23.9    Patient s/p   Chronic anemia with blood loos at delivery  DC t home   F/U office  4 weeks 
RODRÍGUEZ MCNULTY is a 22y  now PPD#2 s/p spontaneous vaginal delivery at 38w2d gestation after IOL for oligohydramnios and GDMA1, uncomplicated.    S:    No acute events overnight.   The patient has no complaints.  Pain controlled with current treatment regimen.   She is ambulating without difficulty and tolerating PO.   + flatus/-BM/+ voiding   She endorses appropriate lochia, which is decreasing.   She is breastfeeding without difficulty.   She denies fevers, chills, nausea and vomiting.   She denies lightheadedness, dizziness, palpitations, chest pain, SOB, RUQ pain, blurry vision, new-onset swelling, and blurry vision.    O:    T(C): 36.4 (23 @ 16:01), Max: 36.4 (23 @ 16:01)  HR: 80 (23 @ 16:01) (80 - 80)  BP: 116/78 (23 @ 16:01) (116/78 - 116/78)  RR: 18 (23 @ 16:01) (18 - 18)  SpO2: 97% (23 @ 16:01) (97% - 97%)    Gen: NAD, AOx3  CV: RRR, S1/S2 present  Pulm: CTAB  Breast: Nontender, non-engorged   Abdomen:  Soft, non-tender, non-distended, +bowel sounds  Uterus:  Fundus firm below umbilicus  VE:  Expected lochia  Ext:  Non-tender and non-edematous                          7.6    x     )-----------( x        ( 2023 04:53 )             23.9           
22y Female s/p labor epidural on 06/15/2023    Vital Signs Last 24 Hrs    T(C): 36.6 (16 Jun 2023 05:18), Max: 36.8 (15 Salazar 2023 10:30)  T(F): 97.9 (16 Jun 2023 05:18), Max: 98.3 (15 Salazar 2023 10:30)  HR: 64 (16 Jun 2023 05:18) (55 - 190)  BP: 103/68 (16 Jun 2023 05:18) (103/68 - 125/70)  BP(mean): --  RR: 18 (16 Jun 2023 05:18) (16 - 18)  SpO2: 98% (16 Jun 2023 05:18) (91% - 100%)    Parameters below as of 16 Jun 2023 05:18    Patient On (Oxygen Delivery Method): room air            Patient's overall anesthesia satisfaction: Positive    Patient seen and doing well     No headache      No residual numbness or weakness, sensory and motor function intact    No anesthetic complications or complaints noted or reported                 
RODRÍGUEZ MCNULTY is a 22y  now PPD#1 s/p spontaneous vaginal delivery at 38w2d gestation after IOL for oligohydramnios and GDMA1.    S:    No acute events overnight.   The patient has no complaints.  Pain controlled with current treatment regimen.   She is ambulating without difficulty and tolerating PO.   + flatus/-BM/+ voiding   She endorses appropriate lochia, which is decreasing.   She is breastfeeding without difficulty.   She denies fevers, chills, nausea and vomiting.   She denies lightheadedness, dizziness, palpitations, chest pain, SOB, RUQ pain, blurry vision, new-onset swelling, and blurry vision.    O:    T(C): 36.6 (23 @ 05:18), Max: 36.8 (06-15-23 @ 10:30)  HR: 64 (23 @ 05:18) (55 - 190)  BP: 103/68 (23 @ 05:18) (103/68 - 125/70)  RR: 18 (23 @ 05:18) (16 - 18)  SpO2: 98% (23 @ 05:18) (90% - 100%)    Gen: NAD, AOx3  CV: RRR, S1/S2 present  Pulm: CTAB  Breast: Nontender, non-engorged   Abdomen:  Soft, non-tender, non-distended, +bowel sounds  Uterus:  Fundus firm below umbilicus  VE:  Expected lochia  Ext:  Non-tender and non-edematous                          7.6    x     )-----------( x        ( 2023 04:53 )             23.9

## 2023-06-17 NOTE — PROGRESS NOTE ADULT - ASSESSMENT
RODRÍGUEZ MCNULTY is a 22y  now PPD#1 s/p spontaneous vaginal delivery at 38w2d gestation after IOL for oligohydramnios and GDMA1.    A/P:    -Vital signs stable  -Hgb: 8.8 -> 8.4 -> 7.6, will ordered venofer  -Voiding, tolerating PO, bowel function nml   -Advance care as tolerated   -Continue routine postpartum care and education  -Healthy male infant, declines circumcision  -Dispo: Consider for discharge
RODRÍGUEZ MCNULTY is a 22y  now PPD#2 s/p spontaneous vaginal delivery at 38w2d gestation after IOL for oligohydramnios and GDMA1, uncomplicated.    A/P:    -Vital signs stable  -Hgb: 8.8 -> 8.4 -> 7.6, patient asymptomatic Venofer given yesterday.  -Voiding, tolerating PO, bowel function nml   -Advance care as tolerated   -Continue routine postpartum care and education  -Healthy male infant, desires circumcision  -Dispo: Consider for discharge today

## 2023-06-23 ENCOUNTER — APPOINTMENT (OUTPATIENT)
Age: 23
End: 2023-06-23

## 2023-07-03 ENCOUNTER — APPOINTMENT (OUTPATIENT)
Dept: HEMATOLOGY ONCOLOGY | Facility: CLINIC | Age: 23
End: 2023-07-03

## 2023-07-03 LAB — SURGICAL PATHOLOGY STUDY: SIGNIFICANT CHANGE UP

## 2023-07-09 ENCOUNTER — OUTPATIENT (OUTPATIENT)
Dept: OUTPATIENT SERVICES | Facility: HOSPITAL | Age: 23
LOS: 1 days | Discharge: HOME | End: 2023-07-09

## 2023-07-09 DIAGNOSIS — O99.019 ANEMIA COMPLICATING PREGNANCY, UNSPECIFIED TRIMESTER: ICD-10-CM

## 2023-07-09 DIAGNOSIS — D50.9 IRON DEFICIENCY ANEMIA, UNSPECIFIED: ICD-10-CM

## 2023-07-09 DIAGNOSIS — Z98.890 OTHER SPECIFIED POSTPROCEDURAL STATES: Chronic | ICD-10-CM

## 2023-07-13 ENCOUNTER — APPOINTMENT (OUTPATIENT)
Dept: HEMATOLOGY ONCOLOGY | Facility: CLINIC | Age: 23
End: 2023-07-13

## 2023-07-27 ENCOUNTER — APPOINTMENT (OUTPATIENT)
Dept: OBGYN | Facility: CLINIC | Age: 23
End: 2023-07-27
Payer: MEDICAID

## 2023-07-27 VITALS
WEIGHT: 121 LBS | HEART RATE: 108 BPM | SYSTOLIC BLOOD PRESSURE: 108 MMHG | DIASTOLIC BLOOD PRESSURE: 78 MMHG | HEIGHT: 61 IN | BODY MASS INDEX: 22.84 KG/M2

## 2023-07-27 DIAGNOSIS — Z11.51 ENCOUNTER FOR SCREENING FOR HUMAN PAPILLOMAVIRUS (HPV): ICD-10-CM

## 2023-07-27 DIAGNOSIS — O99.891 OTHER SPECIFIED DISEASES AND CONDITIONS COMPLICATING PREGNANCY: ICD-10-CM

## 2023-07-27 DIAGNOSIS — Z01.419 ENCOUNTER FOR GYNECOLOGICAL EXAMINATION (GENERAL) (ROUTINE) W/OUT ABNORMAL FINDINGS: ICD-10-CM

## 2023-07-27 PROCEDURE — 99213 OFFICE O/P EST LOW 20 MIN: CPT

## 2023-07-27 NOTE — HISTORY OF PRESENT ILLNESS
[Postpartum Follow Up] : postpartum follow up [Delivery Date: ___] : on [unfilled] [] : delivered by vaginal delivery [Male] : Delivery History: baby boy [Wt. ___] : weighing [unfilled] [Breastfeeding] : not currently nursing [None] : no vaginal bleeding [Healing Well] : is healing well [Cervix Sample Taken] : cervical sample not taken for a Pap smear [Not Done] : Examination of breasts not done [Doing Well] : is doing well [No Sign of Infection] : is showing no signs of infection [Excellent Pain Control] : has excellent pain control [FreeTextEntry1] : Patient is a 22-year-old status post vaginal delivery on Sarah 15, 2023 of a viable male infant weighing 6 pounds 8 ounces\par Patient denies breast-feeding\par Patient denies any difficulty passing urine, passing flatus, bowel movements\par Pelvic exam shows normal female external genitalia, episiotomy healed well, cervix appropriate size, uterus anteverted normal size not tender, adnexa no mass nontender\par Patient does not desire any contraception at this point\par Benign postpartum exam\par Follow-up 6 months or prior to that as needed\par \par Toma was present as a chaperone during entire time of assessment examination of this patient

## 2023-10-31 ENCOUNTER — APPOINTMENT (OUTPATIENT)
Dept: OBGYN | Facility: CLINIC | Age: 23
End: 2023-10-31
Payer: MEDICAID

## 2023-10-31 VITALS — SYSTOLIC BLOOD PRESSURE: 109 MMHG | HEART RATE: 88 BPM | DIASTOLIC BLOOD PRESSURE: 79 MMHG | WEIGHT: 123 LBS

## 2023-10-31 DIAGNOSIS — O23.40 UNSPECIFIED INFECTION OF URINARY TRACT IN PREGNANCY, UNSPECIFIED TRIMESTER: ICD-10-CM

## 2023-10-31 DIAGNOSIS — N91.2 AMENORRHEA, UNSPECIFIED: ICD-10-CM

## 2023-10-31 LAB — HCG UR QL: POSITIVE

## 2023-10-31 PROCEDURE — 81025 URINE PREGNANCY TEST: CPT

## 2023-10-31 PROCEDURE — 99212 OFFICE O/P EST SF 10 MIN: CPT

## 2023-11-10 ENCOUNTER — APPOINTMENT (OUTPATIENT)
Dept: OBGYN | Facility: CLINIC | Age: 23
End: 2023-11-10
Payer: MEDICAID

## 2023-11-10 VITALS — SYSTOLIC BLOOD PRESSURE: 110 MMHG | DIASTOLIC BLOOD PRESSURE: 77 MMHG | HEART RATE: 93 BPM | WEIGHT: 123 LBS

## 2023-11-10 DIAGNOSIS — Z86.19 PERSONAL HISTORY OF OTHER INFECTIOUS AND PARASITIC DISEASES: ICD-10-CM

## 2023-11-10 DIAGNOSIS — Z92.89 PERSONAL HISTORY OF OTHER MEDICAL TREATMENT: ICD-10-CM

## 2023-11-10 DIAGNOSIS — O09.899 SUPERVISION OF OTHER HIGH RISK PREGNANCIES, UNSPECIFIED TRIMESTER: ICD-10-CM

## 2023-11-10 DIAGNOSIS — N39.0 URINARY TRACT INFECTION, SITE NOT SPECIFIED: ICD-10-CM

## 2023-11-10 DIAGNOSIS — Z34.90 ENCOUNTER FOR SUPERVISION OF NORMAL PREGNANCY, UNSPECIFIED, UNSPECIFIED TRIMESTER: ICD-10-CM

## 2023-11-10 DIAGNOSIS — Z83.3 FAMILY HISTORY OF DIABETES MELLITUS: ICD-10-CM

## 2023-11-10 DIAGNOSIS — Z80.6 FAMILY HISTORY OF LEUKEMIA: ICD-10-CM

## 2023-11-10 DIAGNOSIS — Z87.42 PERSONAL HISTORY OF OTHER DISEASES OF THE FEMALE GENITAL TRACT: ICD-10-CM

## 2023-11-10 PROCEDURE — 99214 OFFICE O/P EST MOD 30 MIN: CPT

## 2023-11-10 PROCEDURE — 81002 URINALYSIS NONAUTO W/O SCOPE: CPT

## 2023-11-10 RX ORDER — FOLIC ACID 1 MG/1
1 TABLET ORAL DAILY
Qty: 90 | Refills: 3 | Status: ACTIVE | COMMUNITY
Start: 2023-11-10 | End: 1900-01-01

## 2023-11-10 RX ORDER — BLOOD-GLUCOSE METER
W/DEVICE EACH MISCELLANEOUS
Qty: 1 | Refills: 0 | Status: DISCONTINUED | COMMUNITY
Start: 2023-05-18 | End: 2023-11-10

## 2023-11-10 RX ORDER — TERCONAZOLE 8 MG/G
0.8 CREAM VAGINAL
Qty: 1 | Refills: 0 | Status: DISCONTINUED | COMMUNITY
Start: 2023-04-10 | End: 2023-11-10

## 2023-11-10 RX ORDER — FOLIC ACID 1 MG/1
1 TABLET ORAL DAILY
Qty: 30 | Refills: 5 | Status: DISCONTINUED | COMMUNITY
Start: 2023-06-09 | End: 2023-11-10

## 2023-11-10 RX ORDER — PRENATAL VIT 49/IRON FUM/FOLIC 6.75-0.2MG
TABLET ORAL
Refills: 0 | Status: ACTIVE | COMMUNITY

## 2023-11-10 RX ORDER — LANCETS 30 GAUGE
EACH MISCELLANEOUS
Qty: 1 | Refills: 1 | Status: DISCONTINUED | COMMUNITY
Start: 2023-05-18 | End: 2023-11-10

## 2023-11-10 RX ORDER — BLOOD SUGAR DIAGNOSTIC
STRIP MISCELLANEOUS
Qty: 2 | Refills: 1 | Status: DISCONTINUED | COMMUNITY
Start: 2023-05-18 | End: 2023-11-10

## 2023-11-10 RX ORDER — PRENATAL VIT 49/IRON FUM/FOLIC 6.75-0.2MG
TABLET ORAL
Refills: 0 | Status: DISCONTINUED | COMMUNITY
End: 2023-11-10

## 2023-11-14 ENCOUNTER — NON-APPOINTMENT (OUTPATIENT)
Age: 23
End: 2023-11-14

## 2023-11-20 LAB
A VAGINAE DNA VAG QL NAA+PROBE: NORMAL
BVAB2 DNA VAG QL NAA+PROBE: NORMAL
C KRUSEI DNA VAG QL NAA+PROBE: NEGATIVE
C KRUSEI DNA VAG QL NAA+PROBE: POSITIVE
C TRACH RRNA SPEC QL NAA+PROBE: NEGATIVE
CANDIDA DNA VAG QL NAA+PROBE: NEGATIVE
MEGA1 DNA VAG QL NAA+PROBE: NORMAL
N GONORRHOEA RRNA SPEC QL NAA+PROBE: NEGATIVE
T VAGINALIS RRNA SPEC QL NAA+PROBE: NEGATIVE

## 2023-11-30 ENCOUNTER — NON-APPOINTMENT (OUTPATIENT)
Age: 23
End: 2023-11-30

## 2023-12-01 ENCOUNTER — APPOINTMENT (OUTPATIENT)
Dept: OBGYN | Facility: CLINIC | Age: 23
End: 2023-12-01
Payer: MEDICAID

## 2023-12-01 ENCOUNTER — ASOB RESULT (OUTPATIENT)
Age: 23
End: 2023-12-01

## 2023-12-01 VITALS — DIASTOLIC BLOOD PRESSURE: 74 MMHG | HEART RATE: 73 BPM | SYSTOLIC BLOOD PRESSURE: 109 MMHG | WEIGHT: 125 LBS

## 2023-12-01 PROCEDURE — 76817 TRANSVAGINAL US OBSTETRIC: CPT

## 2023-12-01 PROCEDURE — 0502F SUBSEQUENT PRENATAL CARE: CPT

## 2023-12-01 PROCEDURE — 76801 OB US < 14 WKS SINGLE FETUS: CPT

## 2023-12-11 ENCOUNTER — ASOB RESULT (OUTPATIENT)
Age: 23
End: 2023-12-11

## 2023-12-11 ENCOUNTER — APPOINTMENT (OUTPATIENT)
Dept: MATERNAL FETAL MEDICINE | Facility: CLINIC | Age: 23
End: 2023-12-11
Payer: MEDICAID

## 2023-12-11 PROCEDURE — 99212 OFFICE O/P EST SF 10 MIN: CPT | Mod: 95

## 2023-12-12 DIAGNOSIS — Z13.0 ENCOUNTER FOR SCREENING FOR DISEASES OF THE BLOOD AND BLOOD-FORMING ORGANS AND CERTAIN DISORDERS INVOLVING THE IMMUNE MECHANISM: ICD-10-CM

## 2023-12-12 DIAGNOSIS — Z11.4 ENCOUNTER FOR SCREENING FOR HUMAN IMMUNODEFICIENCY VIRUS [HIV]: ICD-10-CM

## 2023-12-12 DIAGNOSIS — Z11.3 ENCOUNTER FOR SCREENING FOR INFECTIONS WITH A PREDOMINANTLY SEXUAL MODE OF TRANSMISSION: ICD-10-CM

## 2023-12-12 DIAGNOSIS — Z11.59 ENCOUNTER FOR SCREENING FOR OTHER VIRAL DISEASES: ICD-10-CM

## 2023-12-12 DIAGNOSIS — Z3A.09 9 WEEKS GESTATION OF PREGNANCY: ICD-10-CM

## 2023-12-14 ENCOUNTER — APPOINTMENT (OUTPATIENT)
Dept: ANTEPARTUM | Facility: CLINIC | Age: 23
End: 2023-12-14

## 2023-12-14 ENCOUNTER — NON-APPOINTMENT (OUTPATIENT)
Age: 23
End: 2023-12-14

## 2023-12-29 ENCOUNTER — APPOINTMENT (OUTPATIENT)
Dept: ANTEPARTUM | Facility: CLINIC | Age: 23
End: 2023-12-29
Payer: MEDICAID

## 2023-12-29 ENCOUNTER — NON-APPOINTMENT (OUTPATIENT)
Age: 23
End: 2023-12-29

## 2023-12-29 ENCOUNTER — ASOB RESULT (OUTPATIENT)
Age: 23
End: 2023-12-29

## 2023-12-29 ENCOUNTER — APPOINTMENT (OUTPATIENT)
Dept: OBGYN | Facility: CLINIC | Age: 23
End: 2023-12-29
Payer: MEDICAID

## 2023-12-29 VITALS — HEART RATE: 77 BPM | DIASTOLIC BLOOD PRESSURE: 70 MMHG | SYSTOLIC BLOOD PRESSURE: 102 MMHG | WEIGHT: 124 LBS

## 2023-12-29 PROCEDURE — 36416 COLLJ CAPILLARY BLOOD SPEC: CPT

## 2023-12-29 PROCEDURE — 76813 OB US NUCHAL MEAS 1 GEST: CPT

## 2023-12-29 PROCEDURE — 81002 URINALYSIS NONAUTO W/O SCOPE: CPT | Mod: NC

## 2023-12-29 PROCEDURE — 0502F SUBSEQUENT PRENATAL CARE: CPT

## 2023-12-31 PROBLEM — Z11.3 ENCOUNTER FOR SCREENING EXAMINATION FOR SEXUALLY TRANSMITTED DISEASE: Status: RESOLVED | Noted: 2023-04-25 | Resolved: 2023-05-09

## 2024-01-02 ENCOUNTER — NON-APPOINTMENT (OUTPATIENT)
Age: 24
End: 2024-01-02

## 2024-01-03 ENCOUNTER — NON-APPOINTMENT (OUTPATIENT)
Age: 24
End: 2024-01-03

## 2024-01-03 LAB
ADDITIONAL US: NORMAL
COMMENTS: AFP: NORMAL
CRL SCAN TWIN B: NORMAL
CRL SCAN: NORMAL
CROWN RUMP LENGTH TWIN B: NORMAL
CROWN RUMP LENGTH: 58.9 MM
DOWN SYNDROME AGE RISK: NORMAL
DOWN SYNDROME INTERPRETATION: NORMAL
DOWN SYNDROME SCREENING RISK: NORMAL
GEST. AGE ON COLLECTION DATE: 12.3 WEEKS
HCG MOM: 1.49
HCG VALUE: 171.2 IU/ML
MATERNAL AGE AT EDD: 25.8 YR
NOTE: AFP: NORMAL
NT MOM TWIN B: NORMAL
NT TWIN B: NORMAL
NUCHAL TRANSLUCENCY (NT): 1.5 MM
NUCHAL TRANSLUCENCY MOM: 0.98
NUMBER OF FETUSES: 1
PAPP-A MOM: 1.11
PAPP-A VALUE: 1289.1 NG/ML
RACE: NORMAL
RESULTS AFP: NORMAL
SONOGRAPHER ID#: NORMAL
SUBMIT PART 2 SAMPLE USING: NORMAL
TEST RESULTS: AFP: NORMAL
TRISOMY 18 AGE RISK: NORMAL
TRISOMY 18 INTERPRETATION: NORMAL
TRISOMY 18 SCREENING RISK: NORMAL
WEIGHT AFP: 125 LBS

## 2024-01-05 ENCOUNTER — NON-APPOINTMENT (OUTPATIENT)
Age: 24
End: 2024-01-05

## 2024-01-25 ENCOUNTER — APPOINTMENT (OUTPATIENT)
Dept: OBGYN | Facility: CLINIC | Age: 24
End: 2024-01-25

## 2024-01-26 ENCOUNTER — APPOINTMENT (OUTPATIENT)
Dept: OBGYN | Facility: CLINIC | Age: 24
End: 2024-01-26
Payer: MEDICAID

## 2024-01-26 VITALS
BODY MASS INDEX: 23.98 KG/M2 | HEIGHT: 61 IN | DIASTOLIC BLOOD PRESSURE: 65 MMHG | SYSTOLIC BLOOD PRESSURE: 90 MMHG | HEART RATE: 101 BPM | WEIGHT: 127 LBS

## 2024-01-26 DIAGNOSIS — Z3A.13 13 WEEKS GESTATION OF PREGNANCY: ICD-10-CM

## 2024-01-26 PROCEDURE — 0502F SUBSEQUENT PRENATAL CARE: CPT

## 2024-02-06 DIAGNOSIS — O99.619 DISEASES OF THE DIGESTIVE SYSTEM COMPLICATING PREGNANCY, UNSPECIFIED TRIMESTER: ICD-10-CM

## 2024-02-06 DIAGNOSIS — K59.00 DISEASES OF THE DIGESTIVE SYSTEM COMPLICATING PREGNANCY, UNSPECIFIED TRIMESTER: ICD-10-CM

## 2024-02-23 DIAGNOSIS — Z3A.17 17 WEEKS GESTATION OF PREGNANCY: ICD-10-CM

## 2024-02-28 ENCOUNTER — ASOB RESULT (OUTPATIENT)
Age: 24
End: 2024-02-28

## 2024-02-28 ENCOUNTER — APPOINTMENT (OUTPATIENT)
Dept: ANTEPARTUM | Facility: CLINIC | Age: 24
End: 2024-02-28
Payer: MEDICAID

## 2024-02-28 PROCEDURE — 76811 OB US DETAILED SNGL FETUS: CPT

## 2024-02-28 PROCEDURE — 76817 TRANSVAGINAL US OBSTETRIC: CPT

## 2024-02-28 PROCEDURE — 36415 COLL VENOUS BLD VENIPUNCTURE: CPT

## 2024-03-01 ENCOUNTER — NON-APPOINTMENT (OUTPATIENT)
Age: 24
End: 2024-03-01

## 2024-03-04 LAB
ADDITIONAL US: NORMAL
AFP MOM: 0.9
AFP VALUE: 61.1 NG/ML
COLLECTED ON 2: NORMAL
COLLECTED ON: NORMAL
CRL SCAN TWIN B: NORMAL
CRL SCAN: NORMAL
CROWN RUMP LENGTH TWIN B: NORMAL
CROWN RUMP LENGTH: 58.9 MM
DIA MOM: 0.65
DIA VALUE: 148.4 PG/ML
DOWN SYNDROME AGE RISK: NORMAL
DOWN SYNDROME INTERPRETATION: NORMAL
DOWN SYNDROME SCREENING RISK: NORMAL
FIRST TRIMESTER SAMPLE: NORMAL
GEST. AGE ON COLLECTION DATE: 12.3 WEEKS
GESTATIONAL AGE: 21 WEEKS
HCG MOM: 0.95
HCG VALUE: 22.9 IU/ML
INSULIN DEP DIABETES: NO
MATERNAL AGE AT EDD: 25.8 YR
NT MOM TWIN B: NORMAL
NT TWIN B: NORMAL
NUCHAL TRANSLUCENCY (NT): 1.5 MM
NUCHAL TRANSLUCENCY MOM: 0.98
NUMBER OF FETUSES: 1
OPEN SPINA BIFIDA: NORMAL
OSB INTERPRETATION: NORMAL
PAPP-A MOM: 1.11
PAPP-A VALUE: 1289.1 NG/ML
RACE: NORMAL
SECOND TRIMESTER SAMPLE: NORMAL
SEQUENTIAL 2 COMMENTS: NORMAL
SEQUENTIAL 2 NOTE: NORMAL
SEQUENTIAL 2 RESULTS: NORMAL
SEQUENTIAL 2 TEST RESULTS: NORMAL
SONOGRAPHER ID#: NORMAL
TRISOMY 18 AGE RISK: NORMAL
TRISOMY 18 INTERPRETATION: NORMAL
TRISOMY 18 SCREENING RISK: NORMAL
UE3 MOM: 1.04
UE3 VALUE: 2.93 NG/ML
WEIGHT AFP: 125 LBS
WEIGHT: 128 LBS

## 2024-03-05 ENCOUNTER — NON-APPOINTMENT (OUTPATIENT)
Age: 24
End: 2024-03-05

## 2024-03-07 ENCOUNTER — APPOINTMENT (OUTPATIENT)
Dept: OBGYN | Facility: CLINIC | Age: 24
End: 2024-03-07
Payer: MEDICAID

## 2024-03-07 VITALS
SYSTOLIC BLOOD PRESSURE: 109 MMHG | WEIGHT: 132 LBS | HEART RATE: 94 BPM | HEIGHT: 61 IN | DIASTOLIC BLOOD PRESSURE: 68 MMHG | BODY MASS INDEX: 24.92 KG/M2

## 2024-03-07 DIAGNOSIS — O99.810 ABNORMAL GLUCOSE COMPLICATING PREGNANCY: ICD-10-CM

## 2024-03-07 DIAGNOSIS — Z3A.21 21 WEEKS GESTATION OF PREGNANCY: ICD-10-CM

## 2024-03-07 PROCEDURE — 0502F SUBSEQUENT PRENATAL CARE: CPT

## 2024-03-07 PROCEDURE — 81002 URINALYSIS NONAUTO W/O SCOPE: CPT

## 2024-04-10 ENCOUNTER — APPOINTMENT (OUTPATIENT)
Dept: ANTEPARTUM | Facility: CLINIC | Age: 24
End: 2024-04-10
Payer: MEDICAID

## 2024-04-10 ENCOUNTER — ASOB RESULT (OUTPATIENT)
Age: 24
End: 2024-04-10

## 2024-04-10 PROCEDURE — 76816 OB US FOLLOW-UP PER FETUS: CPT

## 2024-04-11 ENCOUNTER — NON-APPOINTMENT (OUTPATIENT)
Age: 24
End: 2024-04-11

## 2024-04-11 DIAGNOSIS — Z3A.23 23 WEEKS GESTATION OF PREGNANCY: ICD-10-CM

## 2024-04-16 ENCOUNTER — NON-APPOINTMENT (OUTPATIENT)
Age: 24
End: 2024-04-16

## 2024-04-16 ENCOUNTER — LABORATORY RESULT (OUTPATIENT)
Age: 24
End: 2024-04-16

## 2024-04-16 ENCOUNTER — APPOINTMENT (OUTPATIENT)
Dept: OBGYN | Facility: CLINIC | Age: 24
End: 2024-04-16
Payer: MEDICAID

## 2024-04-16 VITALS
SYSTOLIC BLOOD PRESSURE: 113 MMHG | DIASTOLIC BLOOD PRESSURE: 72 MMHG | WEIGHT: 136 LBS | HEIGHT: 61 IN | BODY MASS INDEX: 25.68 KG/M2 | HEART RATE: 99 BPM

## 2024-04-16 DIAGNOSIS — R82.90 UNSPECIFIED ABNORMAL FINDINGS IN URINE: ICD-10-CM

## 2024-04-16 DIAGNOSIS — Z36.0 ENCOUNTER FOR ANTENATAL SCREENING FOR CHROMOSOMAL ANOMALIES: ICD-10-CM

## 2024-04-16 LAB
BILIRUB UR QL STRIP: NEGATIVE
CLARITY UR: NORMAL
COLLECTION METHOD: NORMAL
GLUCOSE UR-MCNC: NEGATIVE
HCG UR QL: 0.2 EU/DL
HGB UR QL STRIP.AUTO: NORMAL
KETONES UR-MCNC: NEGATIVE
LEUKOCYTE ESTERASE UR QL STRIP: NORMAL
NITRITE UR QL STRIP: NEGATIVE
PH UR STRIP: 7
PROT UR STRIP-MCNC: 100
SP GR UR STRIP: 1.02

## 2024-04-16 PROCEDURE — 81002 URINALYSIS NONAUTO W/O SCOPE: CPT | Mod: NC

## 2024-04-16 PROCEDURE — 0502F SUBSEQUENT PRENATAL CARE: CPT

## 2024-04-18 LAB
APPEARANCE: ABNORMAL
BILIRUBIN URINE: NEGATIVE
BLOOD URINE: NEGATIVE
COLOR: YELLOW
GLUCOSE QUALITATIVE U: NEGATIVE MG/DL
KETONES URINE: NEGATIVE MG/DL
LEUKOCYTE ESTERASE URINE: ABNORMAL
NITRITE URINE: NEGATIVE
PH URINE: >=9
PROTEIN URINE: 300 MG/DL
SPECIFIC GRAVITY URINE: 1.03
UROBILINOGEN URINE: 1 MG/DL

## 2024-04-19 ENCOUNTER — NON-APPOINTMENT (OUTPATIENT)
Age: 24
End: 2024-04-19

## 2024-04-19 DIAGNOSIS — N39.0 URINARY TRACT INFECTION, SITE NOT SPECIFIED: ICD-10-CM

## 2024-04-19 RX ORDER — SULFAMETHOXAZOLE AND TRIMETHOPRIM 800; 160 MG/1; MG/1
800-160 TABLET ORAL
Qty: 14 | Refills: 0 | Status: ACTIVE | COMMUNITY
Start: 2024-04-19 | End: 1900-01-01

## 2024-04-22 LAB — BACTERIA UR CULT: ABNORMAL

## 2024-05-07 ENCOUNTER — NON-APPOINTMENT (OUTPATIENT)
Age: 24
End: 2024-05-07

## 2024-05-07 ENCOUNTER — APPOINTMENT (OUTPATIENT)
Dept: OBGYN | Facility: CLINIC | Age: 24
End: 2024-05-07
Payer: MEDICAID

## 2024-05-07 VITALS — SYSTOLIC BLOOD PRESSURE: 107 MMHG | HEART RATE: 105 BPM | DIASTOLIC BLOOD PRESSURE: 70 MMHG

## 2024-05-07 DIAGNOSIS — Z3A.28 28 WEEKS GESTATION OF PREGNANCY: ICD-10-CM

## 2024-05-07 PROCEDURE — 81002 URINALYSIS NONAUTO W/O SCOPE: CPT | Mod: NC

## 2024-05-07 PROCEDURE — 0502F SUBSEQUENT PRENATAL CARE: CPT

## 2024-05-10 ENCOUNTER — ASOB RESULT (OUTPATIENT)
Age: 24
End: 2024-05-10

## 2024-05-10 ENCOUNTER — APPOINTMENT (OUTPATIENT)
Dept: ANTEPARTUM | Facility: CLINIC | Age: 24
End: 2024-05-10
Payer: MEDICAID

## 2024-05-10 PROCEDURE — 76816 OB US FOLLOW-UP PER FETUS: CPT

## 2024-05-21 ENCOUNTER — APPOINTMENT (OUTPATIENT)
Dept: OBGYN | Facility: CLINIC | Age: 24
End: 2024-05-21
Payer: MEDICAID

## 2024-05-21 ENCOUNTER — NON-APPOINTMENT (OUTPATIENT)
Age: 24
End: 2024-05-21

## 2024-05-21 VITALS
WEIGHT: 139 LBS | HEIGHT: 61 IN | DIASTOLIC BLOOD PRESSURE: 60 MMHG | SYSTOLIC BLOOD PRESSURE: 99 MMHG | BODY MASS INDEX: 26.24 KG/M2 | HEART RATE: 108 BPM

## 2024-05-21 DIAGNOSIS — Z3A.31 31 WEEKS GESTATION OF PREGNANCY: ICD-10-CM

## 2024-05-21 PROCEDURE — 0502F SUBSEQUENT PRENATAL CARE: CPT

## 2024-05-21 PROCEDURE — 81002 URINALYSIS NONAUTO W/O SCOPE: CPT | Mod: NC

## 2024-05-21 NOTE — OB RN PATIENT PROFILE - LIMIT VISITORS, INFANT PROFILE
no Bed/Stretcher in lowest position, wheels locked, appropriate side rails in place/Call bell, personal items and telephone in reach/Instruct patient to call for assistance before getting out of bed/chair/stretcher/Non-slip footwear applied when patient is off stretcher/[place] to call system/Physically safe environment - no spills, clutter or unnecessary equipment/Purposeful proactive rounding/Room/bathroom lighting operational, light cord in reach

## 2024-05-24 ENCOUNTER — OUTPATIENT (OUTPATIENT)
Dept: OUTPATIENT SERVICES | Facility: HOSPITAL | Age: 24
LOS: 1 days | Discharge: HOME | End: 2024-05-24

## 2024-05-24 DIAGNOSIS — Z98.890 OTHER SPECIFIED POSTPROCEDURAL STATES: Chronic | ICD-10-CM

## 2024-05-24 DIAGNOSIS — D50.9 IRON DEFICIENCY ANEMIA, UNSPECIFIED: ICD-10-CM

## 2024-05-24 DIAGNOSIS — O99.019 ANEMIA COMPLICATING PREGNANCY, UNSPECIFIED TRIMESTER: ICD-10-CM

## 2024-05-31 ENCOUNTER — RESULT REVIEW (OUTPATIENT)
Age: 24
End: 2024-05-31

## 2024-05-31 ENCOUNTER — APPOINTMENT (OUTPATIENT)
Dept: HEMATOLOGY ONCOLOGY | Facility: CLINIC | Age: 24
End: 2024-05-31
Payer: MEDICAID

## 2024-05-31 VITALS
DIASTOLIC BLOOD PRESSURE: 79 MMHG | HEIGHT: 61 IN | SYSTOLIC BLOOD PRESSURE: 127 MMHG | HEART RATE: 92 BPM | BODY MASS INDEX: 26.63 KG/M2 | WEIGHT: 141.03 LBS | OXYGEN SATURATION: 97 %

## 2024-05-31 DIAGNOSIS — Z86.2 PERSONAL HISTORY OF DISEASES OF THE BLOOD AND BLOOD-FORMING ORGANS AND CERTAIN DISORDERS INVOLVING THE IMMUNE MECHANISM: ICD-10-CM

## 2024-05-31 DIAGNOSIS — E53.8 DEFICIENCY OF OTHER SPECIFIED B GROUP VITAMINS: ICD-10-CM

## 2024-05-31 LAB
BASOPHILS # BLD AUTO: 0 K/UL — SIGNIFICANT CHANGE UP (ref 0–0.2)
BASOPHILS NFR BLD AUTO: 0.4 % — SIGNIFICANT CHANGE UP (ref 0–2)
EOSINOPHIL # BLD AUTO: 0 K/UL — SIGNIFICANT CHANGE UP (ref 0–0.5)
EOSINOPHIL NFR BLD AUTO: 0.5 % — SIGNIFICANT CHANGE UP (ref 0–6)
HCT VFR BLD CALC: 27.4 % — LOW (ref 34.5–45)
HGB BLD-MCNC: 9.5 G/DL — LOW (ref 11.5–15.5)
LYMPHOCYTES # BLD AUTO: 1.9 K/UL — SIGNIFICANT CHANGE UP (ref 1–3.3)
LYMPHOCYTES # BLD AUTO: 20.4 % — SIGNIFICANT CHANGE UP (ref 13–44)
MCHC RBC-ENTMCNC: 30.4 PG — SIGNIFICANT CHANGE UP (ref 27–34)
MCHC RBC-ENTMCNC: 34.7 G/DL — SIGNIFICANT CHANGE UP (ref 32–36)
MCV RBC AUTO: 87.5 FL — SIGNIFICANT CHANGE UP (ref 80–100)
MONOCYTES # BLD AUTO: 0.4 K/UL — SIGNIFICANT CHANGE UP (ref 0–0.9)
MONOCYTES NFR BLD AUTO: 4.6 % — SIGNIFICANT CHANGE UP (ref 2–14)
NEUTROPHILS # BLD AUTO: 6.8 K/UL — SIGNIFICANT CHANGE UP (ref 1.8–7.4)
NEUTROPHILS NFR BLD AUTO: 74 % — SIGNIFICANT CHANGE UP (ref 43–77)
PLATELET # BLD AUTO: 276 K/UL — SIGNIFICANT CHANGE UP (ref 150–400)
RBC # BLD: 3.13 M/UL — LOW (ref 3.8–5.2)
RBC # FLD: 11 % — SIGNIFICANT CHANGE UP (ref 10.3–14.5)
WBC # BLD: 9.2 K/UL — SIGNIFICANT CHANGE UP (ref 3.8–10.5)
WBC # FLD AUTO: 9.2 K/UL — SIGNIFICANT CHANGE UP (ref 3.8–10.5)

## 2024-05-31 PROCEDURE — 99214 OFFICE O/P EST MOD 30 MIN: CPT

## 2024-05-31 NOTE — ASSESSMENT
[FreeTextEntry1] : RODRÍGUEZ MCNULTY is a 23 year old F (, MERVIN [birth year], planning for vaginal delivery) with a PMHx of h/o blood transfusions x 3 with previous pregnancy, lithotripsy, infection due to Klebsiella pneumoniae, renal calculi, pyelonephritis, recurrent UTI, moderate hydronephrosis here for evaluation of anemia with pregnancy. S/p admission to [facility] L&D ED on 23 - 23 d/t feeling sluggish, cold sweats, chills x 1 week and noted with Hb 7.7 g, S/p Venofer 200 mg on 23. Patient was referred by GYN, Dr. Hemphill.  #Iron Deficiency Anemia #B12 deficiency #Folate Deficiency - Due to previous history of iron deficiency anemia, will schedule patient for IV Venofer x5 - Will send B12/folate, iron studies, CBC - Pending above labs, may need B12 and folic acid supplementation - At next visit, MMA and HA, pernicious anemia testing

## 2024-05-31 NOTE — HISTORY OF PRESENT ILLNESS
[de-identified] : RODRÍGUEZ MCNULTY is a 23 year old F (, MERVIN [birth year], planning for vaginal delivery) with a PMHx of h/o blood transfusions x 3 with previous pregnancy, lithotripsy, infection due to Klebsiella pneumoniae, renal calculi, pyelonephritis, recurrent UTI, moderate hydronephrosis here for evaluation of anemia with pregnancy. S/p admission to [facility] L&D ED on 23 - 23 d/t feeling sluggish, cold sweats, chills x 1 week and noted with Hb 7.7 g, S/p Venofer 200 mg on 23. Patient was referred by GYN, Dr. Hemphill.  PMHx: h/o blood transfusions, lithotripsy, infection due to Klebsiella pneumoniae, renal calculi, pyelonephritis, recurrent UTI, moderate hydronephrosis Socx: Never smoker   2023: Returns for follow up visit. S/p Venofer on 23, notes tolerating well, denies side effects. Was noted to be B12 and folate deficient. Notes noncompliance with daily Vitamin B12, taking on average 3 times a week. Notes compliance to prenatal vitamin  Interval History: Returns to clinic for anemia in pregnancy. Was placed on oral iron at beginning of pregnancy but minimal improvement in Hgb. Denies GI//mucosal bleeding.

## 2024-06-03 ENCOUNTER — APPOINTMENT (OUTPATIENT)
Age: 24
End: 2024-06-03

## 2024-06-03 LAB
ALBUMIN SERPL ELPH-MCNC: 3.6 G/DL
ALP BLD-CCNC: 147 U/L
ALT SERPL-CCNC: 6 U/L
ANION GAP SERPL CALC-SCNC: 13 MMOL/L
AST SERPL-CCNC: 8 U/L
BILIRUB SERPL-MCNC: 0.4 MG/DL
BUN SERPL-MCNC: 9 MG/DL
CALCIUM SERPL-MCNC: 9.2 MG/DL
CHLORIDE SERPL-SCNC: 101 MMOL/L
CO2 SERPL-SCNC: 21 MMOL/L
CREAT SERPL-MCNC: 0.83 MG/DL
EGFR: 100 ML/MIN/1.73M2
FERRITIN SERPL-MCNC: 9 NG/ML
FOLATE SERPL-MCNC: 5.7 NG/ML
GLUCOSE SERPL-MCNC: 141 MG/DL
IRON SATN MFR SERPL: 5 %
IRON SERPL-MCNC: 27 UG/DL
POTASSIUM SERPL-SCNC: 3.9 MMOL/L
PROT SERPL-MCNC: 6.7 G/DL
SODIUM SERPL-SCNC: 136 MMOL/L
TIBC SERPL-MCNC: 553 UG/DL
UIBC SERPL-MCNC: 525 UG/DL
VIT B12 SERPL-MCNC: 153 PG/ML

## 2024-06-04 ENCOUNTER — NON-APPOINTMENT (OUTPATIENT)
Age: 24
End: 2024-06-04

## 2024-06-04 ENCOUNTER — APPOINTMENT (OUTPATIENT)
Dept: OBGYN | Facility: CLINIC | Age: 24
End: 2024-06-04
Payer: MEDICAID

## 2024-06-04 ENCOUNTER — LABORATORY RESULT (OUTPATIENT)
Age: 24
End: 2024-06-04

## 2024-06-04 VITALS
HEIGHT: 61 IN | HEART RATE: 147 BPM | DIASTOLIC BLOOD PRESSURE: 69 MMHG | WEIGHT: 141 LBS | SYSTOLIC BLOOD PRESSURE: 114 MMHG | BODY MASS INDEX: 26.62 KG/M2

## 2024-06-04 DIAGNOSIS — Z3A.33 33 WEEKS GESTATION OF PREGNANCY: ICD-10-CM

## 2024-06-04 LAB
APPEARANCE: NORMAL
BILIRUBIN URINE: NORMAL
BLOOD URINE: NORMAL
COLOR: NORMAL
GLUCOSE QUALITATIVE U: NORMAL
KETONES URINE: NORMAL
LEUKOCYTE ESTERASE URINE: NORMAL
NITRITE URINE: POSITIVE
PH URINE: 7.5
PROTEIN URINE: 100
SPECIFIC GRAVITY URINE: 1.02

## 2024-06-04 PROCEDURE — 81002 URINALYSIS NONAUTO W/O SCOPE: CPT | Mod: NC

## 2024-06-04 PROCEDURE — 0502F SUBSEQUENT PRENATAL CARE: CPT

## 2024-06-05 LAB
APPEARANCE: ABNORMAL
BILIRUBIN URINE: NEGATIVE
BLOOD URINE: NEGATIVE
COLOR: YELLOW
GLUCOSE QUALITATIVE U: 100 MG/DL
KETONES URINE: NEGATIVE MG/DL
LEUKOCYTE ESTERASE URINE: ABNORMAL
NITRITE URINE: POSITIVE
PH URINE: >=9
PROTEIN URINE: 300 MG/DL
SPECIFIC GRAVITY URINE: 1.02
UROBILINOGEN URINE: 1 MG/DL

## 2024-06-06 ENCOUNTER — NON-APPOINTMENT (OUTPATIENT)
Age: 24
End: 2024-06-06

## 2024-06-06 LAB — B-HEM STREP SPEC QL CULT: NORMAL

## 2024-06-06 RX ORDER — NITROFURANTOIN (MONOHYDRATE/MACROCRYSTALS) 25; 75 MG/1; MG/1
100 CAPSULE ORAL
Qty: 14 | Refills: 0 | Status: ACTIVE | COMMUNITY
Start: 2024-06-06 | End: 1900-01-01

## 2024-06-07 ENCOUNTER — ASOB RESULT (OUTPATIENT)
Age: 24
End: 2024-06-07

## 2024-06-07 ENCOUNTER — APPOINTMENT (OUTPATIENT)
Age: 24
End: 2024-06-07

## 2024-06-07 ENCOUNTER — APPOINTMENT (OUTPATIENT)
Dept: ANTEPARTUM | Facility: CLINIC | Age: 24
End: 2024-06-07
Payer: MEDICAID

## 2024-06-07 PROCEDURE — 76816 OB US FOLLOW-UP PER FETUS: CPT

## 2024-06-07 PROCEDURE — 76819 FETAL BIOPHYS PROFIL W/O NST: CPT

## 2024-06-10 ENCOUNTER — NON-APPOINTMENT (OUTPATIENT)
Age: 24
End: 2024-06-10

## 2024-06-10 DIAGNOSIS — E53.8 DEFICIENCY OF OTHER SPECIFIED B GROUP VITAMINS: ICD-10-CM

## 2024-06-10 DIAGNOSIS — O99.013 ANEMIA COMPLICATING PREGNANCY, THIRD TRIMESTER: ICD-10-CM

## 2024-06-10 LAB — BACTERIA UR CULT: ABNORMAL

## 2024-06-10 RX ORDER — AMPICILLIN 500 MG/1
500 CAPSULE ORAL 4 TIMES DAILY
Qty: 28 | Refills: 0 | Status: ACTIVE | COMMUNITY
Start: 2024-06-10 | End: 1900-01-01

## 2024-06-11 ENCOUNTER — NON-APPOINTMENT (OUTPATIENT)
Age: 24
End: 2024-06-11

## 2024-06-11 ENCOUNTER — APPOINTMENT (OUTPATIENT)
Dept: OBGYN | Facility: CLINIC | Age: 24
End: 2024-06-11
Payer: MEDICAID

## 2024-06-11 VITALS
BODY MASS INDEX: 26.81 KG/M2 | HEART RATE: 92 BPM | HEIGHT: 61 IN | SYSTOLIC BLOOD PRESSURE: 104 MMHG | WEIGHT: 142 LBS | DIASTOLIC BLOOD PRESSURE: 67 MMHG

## 2024-06-11 DIAGNOSIS — O23.43 UNSPECIFIED INFECTION OF URINARY TRACT IN PREGNANCY, THIRD TRIMESTER: ICD-10-CM

## 2024-06-11 DIAGNOSIS — O35.9XX0 MATERNAL CARE FOR (SUSPECTED) FETAL ABNORMALITY AND DAMAGE, UNSPECIFIED, NOT APPLICABLE OR UNSPECIFIED: ICD-10-CM

## 2024-06-11 DIAGNOSIS — Z36.85 ENCOUNTER FOR ANTENATAL SCREENING FOR STREPTOCOCCUS B: ICD-10-CM

## 2024-06-11 DIAGNOSIS — Z3A.35 35 WEEKS GESTATION OF PREGNANCY: ICD-10-CM

## 2024-06-11 PROCEDURE — 0502F SUBSEQUENT PRENATAL CARE: CPT

## 2024-06-11 PROCEDURE — 81002 URINALYSIS NONAUTO W/O SCOPE: CPT | Mod: NC

## 2024-06-13 ENCOUNTER — APPOINTMENT (OUTPATIENT)
Age: 24
End: 2024-06-13

## 2024-06-17 ENCOUNTER — APPOINTMENT (OUTPATIENT)
Age: 24
End: 2024-06-17

## 2024-06-18 ENCOUNTER — NON-APPOINTMENT (OUTPATIENT)
Age: 24
End: 2024-06-18

## 2024-06-18 ENCOUNTER — APPOINTMENT (OUTPATIENT)
Dept: OBGYN | Facility: CLINIC | Age: 24
End: 2024-06-18
Payer: MEDICAID

## 2024-06-18 VITALS
HEART RATE: 106 BPM | DIASTOLIC BLOOD PRESSURE: 72 MMHG | HEIGHT: 61 IN | SYSTOLIC BLOOD PRESSURE: 113 MMHG | WEIGHT: 143 LBS | BODY MASS INDEX: 27 KG/M2

## 2024-06-18 DIAGNOSIS — Z00.00 ENCOUNTER FOR GENERAL ADULT MEDICAL EXAMINATION W/OUT ABNORMAL FINDINGS: ICD-10-CM

## 2024-06-18 PROCEDURE — 0502F SUBSEQUENT PRENATAL CARE: CPT

## 2024-06-18 PROCEDURE — 81002 URINALYSIS NONAUTO W/O SCOPE: CPT | Mod: NC

## 2024-06-20 ENCOUNTER — APPOINTMENT (OUTPATIENT)
Age: 24
End: 2024-06-20

## 2024-06-21 ENCOUNTER — OUTPATIENT (OUTPATIENT)
Dept: OUTPATIENT SERVICES | Facility: HOSPITAL | Age: 24
LOS: 1 days | End: 2024-06-21
Payer: MEDICAID

## 2024-06-21 VITALS — SYSTOLIC BLOOD PRESSURE: 103 MMHG | DIASTOLIC BLOOD PRESSURE: 63 MMHG | HEART RATE: 78 BPM

## 2024-06-21 VITALS — HEART RATE: 89 BPM | SYSTOLIC BLOOD PRESSURE: 107 MMHG | DIASTOLIC BLOOD PRESSURE: 63 MMHG

## 2024-06-21 DIAGNOSIS — Z98.890 OTHER SPECIFIED POSTPROCEDURAL STATES: Chronic | ICD-10-CM

## 2024-06-21 DIAGNOSIS — O26.899 OTHER SPECIFIED PREGNANCY RELATED CONDITIONS, UNSPECIFIED TRIMESTER: ICD-10-CM

## 2024-06-21 PROCEDURE — 59025 FETAL NON-STRESS TEST: CPT

## 2024-06-21 PROCEDURE — G0463: CPT

## 2024-06-24 ENCOUNTER — NON-APPOINTMENT (OUTPATIENT)
Age: 24
End: 2024-06-24

## 2024-06-24 ENCOUNTER — APPOINTMENT (OUTPATIENT)
Dept: OBGYN | Facility: CLINIC | Age: 24
End: 2024-06-24
Payer: MEDICAID

## 2024-06-24 ENCOUNTER — TRANSCRIPTION ENCOUNTER (OUTPATIENT)
Age: 24
End: 2024-06-24

## 2024-06-24 ENCOUNTER — INPATIENT (INPATIENT)
Facility: HOSPITAL | Age: 24
LOS: 1 days | Discharge: HOME | DRG: 833 | End: 2024-06-26
Attending: OBSTETRICS & GYNECOLOGY | Admitting: OBSTETRICS & GYNECOLOGY
Payer: MEDICAID

## 2024-06-24 VITALS
HEART RATE: 89 BPM | DIASTOLIC BLOOD PRESSURE: 72 MMHG | HEIGHT: 61 IN | WEIGHT: 141 LBS | BODY MASS INDEX: 26.62 KG/M2 | SYSTOLIC BLOOD PRESSURE: 101 MMHG

## 2024-06-24 VITALS — SYSTOLIC BLOOD PRESSURE: 111 MMHG | DIASTOLIC BLOOD PRESSURE: 67 MMHG | HEART RATE: 89 BPM

## 2024-06-24 DIAGNOSIS — Z13.0 ENCOUNTER FOR SCREENING FOR DISEASES OF THE BLOOD AND BLOOD-FORMING ORGANS AND CERTAIN DISORDERS INVOLVING THE IMMUNE MECHANISM: ICD-10-CM

## 2024-06-24 DIAGNOSIS — O47.1 FALSE LABOR AT OR AFTER 37 COMPLETED WEEKS OF GESTATION: ICD-10-CM

## 2024-06-24 DIAGNOSIS — Z3A.36 36 WEEKS GESTATION OF PREGNANCY: ICD-10-CM

## 2024-06-24 DIAGNOSIS — Z98.890 OTHER SPECIFIED POSTPROCEDURAL STATES: Chronic | ICD-10-CM

## 2024-06-24 DIAGNOSIS — O26.893 OTHER SPECIFIED PREGNANCY RELATED CONDITIONS, THIRD TRIMESTER: ICD-10-CM

## 2024-06-24 DIAGNOSIS — Z3A.38 38 WEEKS GESTATION OF PREGNANCY: ICD-10-CM

## 2024-06-24 DIAGNOSIS — O26.899 OTHER SPECIFIED PREGNANCY RELATED CONDITIONS, UNSPECIFIED TRIMESTER: ICD-10-CM

## 2024-06-24 DIAGNOSIS — O09.293 SUPERVISION OF PREGNANCY WITH OTHER POOR REPRODUCTIVE OR OBSTETRIC HISTORY, THIRD TRIMESTER: ICD-10-CM

## 2024-06-24 DIAGNOSIS — Z11.3 ENCOUNTER FOR SCREENING FOR INFECTIONS WITH A PREDOMINANTLY SEXUAL MODE OF TRANSMISSION: ICD-10-CM

## 2024-06-24 DIAGNOSIS — D21.9 BENIGN NEOPLASM OF CONNECTIVE AND OTHER SOFT TISSUE, UNSPECIFIED: ICD-10-CM

## 2024-06-24 DIAGNOSIS — O99.013 ANEMIA COMPLICATING PREGNANCY, THIRD TRIMESTER: ICD-10-CM

## 2024-06-24 DIAGNOSIS — D50.9 IRON DEFICIENCY ANEMIA, UNSPECIFIED: ICD-10-CM

## 2024-06-24 DIAGNOSIS — O99.891 OTHER SPECIFIED DISEASES AND CONDITIONS COMPLICATING PREGNANCY: ICD-10-CM

## 2024-06-24 LAB
BASOPHILS # BLD AUTO: 0.03 K/UL — SIGNIFICANT CHANGE UP (ref 0–0.2)
BASOPHILS NFR BLD AUTO: 0.3 % — SIGNIFICANT CHANGE UP (ref 0–2)
BLD GP AB SCN SERPL QL: SIGNIFICANT CHANGE UP
EOSINOPHIL # BLD AUTO: 0.02 K/UL — SIGNIFICANT CHANGE UP (ref 0–0.5)
EOSINOPHIL NFR BLD AUTO: 0.2 % — SIGNIFICANT CHANGE UP (ref 0–6)
HCT VFR BLD CALC: 32.8 % — LOW (ref 34.5–45)
HGB BLD-MCNC: 10.8 G/DL — LOW (ref 11.5–15.5)
HIV 1 & 2 AB SERPL IA.RAPID: SIGNIFICANT CHANGE UP
IMM GRANULOCYTES NFR BLD AUTO: 0.7 % — SIGNIFICANT CHANGE UP (ref 0–0.9)
LYMPHOCYTES # BLD AUTO: 1.36 K/UL — SIGNIFICANT CHANGE UP (ref 1–3.3)
LYMPHOCYTES # BLD AUTO: 15.7 % — SIGNIFICANT CHANGE UP (ref 13–44)
MCHC RBC-ENTMCNC: 29.6 PG — SIGNIFICANT CHANGE UP (ref 27–34)
MCHC RBC-ENTMCNC: 32.9 GM/DL — SIGNIFICANT CHANGE UP (ref 32–36)
MCV RBC AUTO: 89.9 FL — SIGNIFICANT CHANGE UP (ref 80–100)
MONOCYTES # BLD AUTO: 0.36 K/UL — SIGNIFICANT CHANGE UP (ref 0–0.9)
MONOCYTES NFR BLD AUTO: 4.2 % — SIGNIFICANT CHANGE UP (ref 2–14)
NEUTROPHILS # BLD AUTO: 6.84 K/UL — SIGNIFICANT CHANGE UP (ref 1.8–7.4)
NEUTROPHILS NFR BLD AUTO: 78.9 % — HIGH (ref 43–77)
PLATELET # BLD AUTO: 223 K/UL — SIGNIFICANT CHANGE UP (ref 150–400)
RBC # BLD: 3.65 M/UL — LOW (ref 3.8–5.2)
RBC # FLD: 15 % — HIGH (ref 10.3–14.5)
WBC # BLD: 8.67 K/UL — SIGNIFICANT CHANGE UP (ref 3.8–10.5)
WBC # FLD AUTO: 8.67 K/UL — SIGNIFICANT CHANGE UP (ref 3.8–10.5)

## 2024-06-24 PROCEDURE — 0502F SUBSEQUENT PRENATAL CARE: CPT

## 2024-06-24 PROCEDURE — 59400 OBSTETRICAL CARE: CPT | Mod: U7

## 2024-06-24 RX ORDER — DIPHENHYDRAMINE HCL 12.5MG/5ML
25 ELIXIR ORAL EVERY 6 HOURS
Refills: 0 | Status: DISCONTINUED | OUTPATIENT
Start: 2024-06-24 | End: 2024-06-26

## 2024-06-24 RX ORDER — PRAMOXINE HCL 1 %
1 CREAM (GRAM) RECTAL EVERY 4 HOURS
Refills: 0 | Status: DISCONTINUED | OUTPATIENT
Start: 2024-06-24 | End: 2024-06-26

## 2024-06-24 RX ORDER — TRISODIUM CITRATE DIHYDRATE AND CITRIC ACID MONOHYDRATE 500; 334 MG/5ML; MG/5ML
30 SOLUTION ORAL ONCE
Refills: 0 | Status: DISCONTINUED | OUTPATIENT
Start: 2024-06-24 | End: 2024-06-24

## 2024-06-24 RX ORDER — SIMETHICONE 40MG/0.6ML
80 SUSPENSION, DROPS(FINAL DOSAGE FORM)(ML) ORAL EVERY 4 HOURS
Refills: 0 | Status: DISCONTINUED | OUTPATIENT
Start: 2024-06-24 | End: 2024-06-26

## 2024-06-24 RX ORDER — OXYCODONE HYDROCHLORIDE 100 MG/5ML
5 SOLUTION ORAL
Refills: 0 | Status: DISCONTINUED | OUTPATIENT
Start: 2024-06-24 | End: 2024-06-26

## 2024-06-24 RX ORDER — HYDROCORTISONE VALERATE 0.2 %
1 CREAM (GRAM) TOPICAL EVERY 6 HOURS
Refills: 0 | Status: DISCONTINUED | OUTPATIENT
Start: 2024-06-24 | End: 2024-06-26

## 2024-06-24 RX ORDER — ACETAMINOPHEN 325 MG
975 TABLET ORAL
Refills: 0 | Status: DISCONTINUED | OUTPATIENT
Start: 2024-06-24 | End: 2024-06-26

## 2024-06-24 RX ORDER — KETOROLAC TROMETHAMINE 30 MG/ML
30 INJECTION, SOLUTION INTRAMUSCULAR ONCE
Refills: 0 | Status: DISCONTINUED | OUTPATIENT
Start: 2024-06-24 | End: 2024-06-24

## 2024-06-24 RX ORDER — OXYTOCIN 30 [USP'U]/500ML
41.67 INJECTION, SOLUTION INTRAVENOUS
Qty: 20 | Refills: 0 | Status: DISCONTINUED | OUTPATIENT
Start: 2024-06-24 | End: 2024-06-26

## 2024-06-24 RX ORDER — TETANUS TOXOID, REDUCED DIPHTHERIA TOXOID AND ACELLULAR PERTUSSIS VACCINE, ADSORBED 5; 2.5; 8; 8; 2.5 [IU]/.5ML; [IU]/.5ML; UG/.5ML; UG/.5ML; UG/.5ML
0.5 SUSPENSION INTRAMUSCULAR ONCE
Refills: 0 | Status: DISCONTINUED | OUTPATIENT
Start: 2024-06-24 | End: 2024-06-26

## 2024-06-24 RX ORDER — PRENATAL VIT/IRON FUM/FOLIC AC 60 MG-1 MG
1 TABLET ORAL DAILY
Refills: 0 | Status: DISCONTINUED | OUTPATIENT
Start: 2024-06-24 | End: 2024-06-26

## 2024-06-24 RX ORDER — BENZOCAINE 15 %
1 LIQUID (ML) TOPICAL EVERY 6 HOURS
Refills: 0 | Status: DISCONTINUED | OUTPATIENT
Start: 2024-06-24 | End: 2024-06-26

## 2024-06-24 RX ORDER — DIBUCAINE 1 %
1 OINTMENT (GRAM) TOPICAL EVERY 6 HOURS
Refills: 0 | Status: DISCONTINUED | OUTPATIENT
Start: 2024-06-24 | End: 2024-06-26

## 2024-06-24 RX ORDER — OXYTOCIN 30 [USP'U]/500ML
INJECTION, SOLUTION INTRAVENOUS
Qty: 30 | Refills: 0 | Status: DISCONTINUED | OUTPATIENT
Start: 2024-06-24 | End: 2024-06-24

## 2024-06-24 RX ORDER — OXYCODONE HYDROCHLORIDE 100 MG/5ML
5 SOLUTION ORAL ONCE
Refills: 0 | Status: DISCONTINUED | OUTPATIENT
Start: 2024-06-24 | End: 2024-06-26

## 2024-06-24 RX ORDER — HYDROCORTISONE ACETATE 1 %
1 OINTMENT (GRAM) RECTAL EVERY 4 HOURS
Refills: 0 | Status: DISCONTINUED | OUTPATIENT
Start: 2024-06-24 | End: 2024-06-26

## 2024-06-24 RX ORDER — SODIUM CHLORIDE 0.9 % (FLUSH) 0.9 %
3 SYRINGE (ML) INJECTION EVERY 8 HOURS
Refills: 0 | Status: DISCONTINUED | OUTPATIENT
Start: 2024-06-24 | End: 2024-06-26

## 2024-06-24 RX ORDER — LANOLIN
1 WAX (GRAM) MISCELLANEOUS EVERY 6 HOURS
Refills: 0 | Status: DISCONTINUED | OUTPATIENT
Start: 2024-06-24 | End: 2024-06-26

## 2024-06-24 RX ORDER — OXYTOCIN 30 [USP'U]/500ML
333.33 INJECTION, SOLUTION INTRAVENOUS
Qty: 20 | Refills: 0 | Status: COMPLETED | OUTPATIENT
Start: 2024-06-24 | End: 2024-06-24

## 2024-06-24 RX ORDER — DEXTROSE MONOHYDRATE AND SODIUM CHLORIDE 5; .3 G/100ML; G/100ML
1000 INJECTION, SOLUTION INTRAVENOUS
Refills: 0 | Status: DISCONTINUED | OUTPATIENT
Start: 2024-06-24 | End: 2024-06-24

## 2024-06-24 RX ADMIN — OXYTOCIN 2 MILLIUNIT(S)/MIN: 30 INJECTION, SOLUTION INTRAVENOUS at 13:27

## 2024-06-24 RX ADMIN — KETOROLAC TROMETHAMINE 30 MILLIGRAM(S): 30 INJECTION, SOLUTION INTRAMUSCULAR at 19:41

## 2024-06-24 RX ADMIN — OXYTOCIN 1000 MILLIUNIT(S)/MIN: 30 INJECTION, SOLUTION INTRAVENOUS at 18:47

## 2024-06-24 RX ADMIN — Medication 3 MILLILITER(S): at 23:39

## 2024-06-24 RX ADMIN — KETOROLAC TROMETHAMINE 30 MILLIGRAM(S): 30 INJECTION, SOLUTION INTRAMUSCULAR at 20:11

## 2024-06-24 RX ADMIN — DEXTROSE MONOHYDRATE AND SODIUM CHLORIDE 125 MILLILITER(S): 5; .3 INJECTION, SOLUTION INTRAVENOUS at 12:50

## 2024-06-25 LAB
HCT VFR BLD CALC: 30.3 % — LOW (ref 34.5–45)
HGB BLD-MCNC: 10 G/DL — LOW (ref 11.5–15.5)
HIV 1+2 AB+HIV1 P24 AG SERPL QL IA: SIGNIFICANT CHANGE UP
T PALLIDUM AB TITR SER: NEGATIVE — SIGNIFICANT CHANGE UP

## 2024-06-25 RX ORDER — ACETAMINOPHEN 325 MG
3 TABLET ORAL
Qty: 0 | Refills: 0 | DISCHARGE
Start: 2024-06-25

## 2024-06-25 RX ORDER — PRENATAL VIT/IRON FUM/FOLIC AC 60 MG-1 MG
1 TABLET ORAL
Qty: 0 | Refills: 0 | DISCHARGE
Start: 2024-06-25

## 2024-06-25 RX ADMIN — Medication 975 MILLIGRAM(S): at 21:33

## 2024-06-25 RX ADMIN — Medication 1 TABLET(S): at 11:43

## 2024-06-25 RX ADMIN — Medication 600 MILLIGRAM(S): at 23:40

## 2024-06-25 RX ADMIN — Medication 3 MILLILITER(S): at 23:51

## 2024-06-25 RX ADMIN — Medication 600 MILLIGRAM(S): at 11:43

## 2024-06-25 RX ADMIN — Medication 975 MILLIGRAM(S): at 02:17

## 2024-06-25 RX ADMIN — Medication 3 MILLILITER(S): at 13:11

## 2024-06-25 RX ADMIN — Medication 3 MILLILITER(S): at 05:50

## 2024-06-25 RX ADMIN — Medication 600 MILLIGRAM(S): at 17:56

## 2024-06-25 RX ADMIN — Medication 600 MILLIGRAM(S): at 05:21

## 2024-06-25 RX ADMIN — Medication 975 MILLIGRAM(S): at 14:43

## 2024-06-25 RX ADMIN — Medication 975 MILLIGRAM(S): at 09:40

## 2024-06-26 VITALS
SYSTOLIC BLOOD PRESSURE: 108 MMHG | TEMPERATURE: 98 F | OXYGEN SATURATION: 96 % | RESPIRATION RATE: 18 BRPM | DIASTOLIC BLOOD PRESSURE: 70 MMHG | HEART RATE: 84 BPM

## 2024-06-26 PROCEDURE — 86850 RBC ANTIBODY SCREEN: CPT

## 2024-06-26 PROCEDURE — 59050 FETAL MONITOR W/REPORT: CPT

## 2024-06-26 PROCEDURE — 86703 HIV-1/HIV-2 1 RESULT ANTBDY: CPT

## 2024-06-26 PROCEDURE — 85018 HEMOGLOBIN: CPT

## 2024-06-26 PROCEDURE — 36415 COLL VENOUS BLD VENIPUNCTURE: CPT

## 2024-06-26 PROCEDURE — 86901 BLOOD TYPING SEROLOGIC RH(D): CPT

## 2024-06-26 PROCEDURE — 85025 COMPLETE CBC W/AUTO DIFF WBC: CPT

## 2024-06-26 PROCEDURE — 85014 HEMATOCRIT: CPT

## 2024-06-26 PROCEDURE — 86780 TREPONEMA PALLIDUM: CPT

## 2024-06-26 PROCEDURE — 87389 HIV-1 AG W/HIV-1&-2 AB AG IA: CPT

## 2024-06-26 PROCEDURE — 86900 BLOOD TYPING SEROLOGIC ABO: CPT

## 2024-06-26 RX ADMIN — Medication 3 MILLILITER(S): at 05:43

## 2024-06-26 RX ADMIN — Medication 1 TABLET(S): at 12:28

## 2024-06-26 RX ADMIN — Medication 975 MILLIGRAM(S): at 02:27

## 2024-06-26 RX ADMIN — Medication 975 MILLIGRAM(S): at 08:54

## 2024-06-26 RX ADMIN — Medication 600 MILLIGRAM(S): at 05:42

## 2024-06-26 RX ADMIN — Medication 600 MILLIGRAM(S): at 12:27

## 2024-06-27 DIAGNOSIS — E53.8 DEFICIENCY OF OTHER SPECIFIED B GROUP VITAMINS: ICD-10-CM

## 2024-06-27 DIAGNOSIS — D50.9 ANEMIA COMPLICATING PREGNANCY, UNSPECIFIED TRIMESTER: ICD-10-CM

## 2024-06-27 DIAGNOSIS — O99.019 ANEMIA COMPLICATING PREGNANCY, UNSPECIFIED TRIMESTER: ICD-10-CM

## 2024-06-28 ENCOUNTER — APPOINTMENT (OUTPATIENT)
Age: 24
End: 2024-06-28

## 2024-07-08 PROBLEM — N39.0 URINARY TRACT INFECTION, SITE NOT SPECIFIED: Chronic | Status: ACTIVE | Noted: 2024-06-24

## 2024-07-08 PROBLEM — Z86.19 PERSONAL HISTORY OF OTHER INFECTIOUS AND PARASITIC DISEASES: Chronic | Status: ACTIVE | Noted: 2024-06-24

## 2024-07-12 ENCOUNTER — APPOINTMENT (OUTPATIENT)
Dept: HEMATOLOGY ONCOLOGY | Facility: CLINIC | Age: 24
End: 2024-07-12
Payer: MEDICAID

## 2024-07-12 ENCOUNTER — RESULT REVIEW (OUTPATIENT)
Age: 24
End: 2024-07-12

## 2024-07-12 VITALS
DIASTOLIC BLOOD PRESSURE: 77 MMHG | BODY MASS INDEX: 23.81 KG/M2 | HEART RATE: 58 BPM | SYSTOLIC BLOOD PRESSURE: 114 MMHG | TEMPERATURE: 97.2 F | OXYGEN SATURATION: 97 % | HEIGHT: 61 IN | WEIGHT: 126.13 LBS

## 2024-07-12 DIAGNOSIS — D50.9 ANEMIA COMPLICATING PREGNANCY, UNSPECIFIED TRIMESTER: ICD-10-CM

## 2024-07-12 DIAGNOSIS — E53.8 DEFICIENCY OF OTHER SPECIFIED B GROUP VITAMINS: ICD-10-CM

## 2024-07-12 DIAGNOSIS — O99.019 ANEMIA COMPLICATING PREGNANCY, UNSPECIFIED TRIMESTER: ICD-10-CM

## 2024-07-12 LAB
BASOPHILS # BLD AUTO: 0.1 K/UL — SIGNIFICANT CHANGE UP (ref 0–0.2)
BASOPHILS NFR BLD AUTO: 1.5 % — SIGNIFICANT CHANGE UP (ref 0–2)
EOSINOPHIL # BLD AUTO: 0.1 K/UL — SIGNIFICANT CHANGE UP (ref 0–0.5)
EOSINOPHIL NFR BLD AUTO: 1.6 % — SIGNIFICANT CHANGE UP (ref 0–6)
HCT VFR BLD CALC: 43.6 % — SIGNIFICANT CHANGE UP (ref 34.5–45)
HGB BLD-MCNC: 13.9 G/DL — SIGNIFICANT CHANGE UP (ref 11.5–15.5)
LYMPHOCYTES # BLD AUTO: 2.1 K/UL — SIGNIFICANT CHANGE UP (ref 1–3.3)
LYMPHOCYTES # BLD AUTO: 32.8 % — SIGNIFICANT CHANGE UP (ref 13–44)
MCHC RBC-ENTMCNC: 29.5 PG — SIGNIFICANT CHANGE UP (ref 27–34)
MCHC RBC-ENTMCNC: 31.8 G/DL — LOW (ref 32–36)
MCV RBC AUTO: 92.7 FL — SIGNIFICANT CHANGE UP (ref 80–100)
MONOCYTES # BLD AUTO: 0.4 K/UL — SIGNIFICANT CHANGE UP (ref 0–0.9)
MONOCYTES NFR BLD AUTO: 6.4 % — SIGNIFICANT CHANGE UP (ref 2–14)
NEUTROPHILS # BLD AUTO: 3.7 K/UL — SIGNIFICANT CHANGE UP (ref 1.8–7.4)
NEUTROPHILS NFR BLD AUTO: 57.7 % — SIGNIFICANT CHANGE UP (ref 43–77)
PLATELET # BLD AUTO: 274 K/UL — SIGNIFICANT CHANGE UP (ref 150–400)
RBC # BLD: 4.7 M/UL — SIGNIFICANT CHANGE UP (ref 3.8–5.2)
RBC # FLD: 12.4 % — SIGNIFICANT CHANGE UP (ref 10.3–14.5)
WBC # BLD: 6.4 K/UL — SIGNIFICANT CHANGE UP (ref 3.8–10.5)
WBC # FLD AUTO: 6.4 K/UL — SIGNIFICANT CHANGE UP (ref 3.8–10.5)

## 2024-07-12 PROCEDURE — 99214 OFFICE O/P EST MOD 30 MIN: CPT

## 2024-07-16 ENCOUNTER — NON-APPOINTMENT (OUTPATIENT)
Age: 24
End: 2024-07-16

## 2024-07-22 ENCOUNTER — OUTPATIENT (OUTPATIENT)
Dept: OUTPATIENT SERVICES | Facility: HOSPITAL | Age: 24
LOS: 1 days | Discharge: HOME | End: 2024-07-22

## 2024-07-22 DIAGNOSIS — Z98.890 OTHER SPECIFIED POSTPROCEDURAL STATES: Chronic | ICD-10-CM

## 2024-07-22 DIAGNOSIS — O99.013 ANEMIA COMPLICATING PREGNANCY, THIRD TRIMESTER: ICD-10-CM

## 2024-08-08 PROBLEM — Z3A.38 38 WEEKS GESTATION OF PREGNANCY: Status: RESOLVED | Noted: 2024-06-24 | Resolved: 2024-08-08

## 2024-08-08 PROBLEM — O99.019 IRON DEFICIENCY ANEMIA OF PREGNANCY: Status: RESOLVED | Noted: 2024-05-30 | Resolved: 2024-08-08

## 2024-08-08 PROBLEM — O99.810 GLUCOSE INTOLERANCE OF PREGNANCY: Status: RESOLVED | Noted: 2024-03-07 | Resolved: 2024-08-08

## 2024-08-08 PROBLEM — O09.899 SHORT INTERVAL BETWEEN PREGNANCIES AFFECTING PREGNANCY, ANTEPARTUM: Status: RESOLVED | Noted: 2023-11-10 | Resolved: 2024-08-08

## 2024-08-08 PROBLEM — O23.43 URINARY TRACT INFECTION IN MOTHER DURING THIRD TRIMESTER OF PREGNANCY: Status: RESOLVED | Noted: 2024-06-11 | Resolved: 2024-08-08

## 2024-08-08 PROBLEM — R82.90 ABNORMAL URINALYSIS: Status: RESOLVED | Noted: 2024-04-16 | Resolved: 2024-08-08

## 2024-08-08 PROBLEM — O99.619 CONSTIPATION DURING PREGNANCY, ANTEPARTUM: Status: RESOLVED | Noted: 2024-02-06 | Resolved: 2024-08-08

## 2024-08-09 ENCOUNTER — APPOINTMENT (OUTPATIENT)
Age: 24
End: 2024-08-09

## 2024-08-13 ENCOUNTER — APPOINTMENT (OUTPATIENT)
Dept: OBGYN | Facility: CLINIC | Age: 24
End: 2024-08-13
Payer: MEDICAID

## 2024-08-13 VITALS
WEIGHT: 121 LBS | SYSTOLIC BLOOD PRESSURE: 111 MMHG | DIASTOLIC BLOOD PRESSURE: 77 MMHG | HEART RATE: 79 BPM | HEIGHT: 61 IN | BODY MASS INDEX: 22.84 KG/M2

## 2024-08-13 DIAGNOSIS — Z87.42 PERSONAL HISTORY OF OTHER DISEASES OF THE FEMALE GENITAL TRACT: ICD-10-CM

## 2024-08-13 PROCEDURE — 0503F POSTPARTUM CARE VISIT: CPT

## 2024-08-13 RX ORDER — MEDROXYPROGESTERONE ACETATE 150 MG/ML
150 INJECTION, SUSPENSION INTRAMUSCULAR
Qty: 1 | Refills: 3 | Status: ACTIVE | COMMUNITY
Start: 2024-08-13 | End: 1900-01-01

## 2024-08-13 NOTE — HISTORY OF PRESENT ILLNESS
[Delivery Date: ___] : on [unfilled] [] : delivered by vaginal delivery [Male] : Delivery History: baby boy [Wt. ___] : weighing [unfilled] [Breastfeeding] : currently nursing [Back to Normal] : is back to normal in size [None] : no vaginal bleeding [Not Done] : Examination of breasts not done [Doing Well] : is doing well [No Sign of Infection] : is showing no signs of infection [Excellent Pain Control] : has excellent pain control [Cervix Sample Taken] : cervical sample not taken for a Pap smear [FreeTextEntry1] : Patient is a 23-year-old status post vaginal delivery on June 24,, Patient states she breast-feeding Patient denies any difficulty passing urine, passing flatus, bowel movements Pelvic exam shows normal female genitalia, vagina lesions, cervix appropriate size, uterus anteverted normal size nontender, adnexa no mass nontender Patient will be be prescribed Depo-Provera for contraception Follow-up for next first injection Essentially benign postpartum exam

## 2025-05-19 ENCOUNTER — APPOINTMENT (OUTPATIENT)
Dept: OBGYN | Facility: CLINIC | Age: 25
End: 2025-05-19

## 2025-07-31 DIAGNOSIS — N39.0 URINARY TRACT INFECTION, SITE NOT SPECIFIED: ICD-10-CM

## 2025-08-01 ENCOUNTER — APPOINTMENT (OUTPATIENT)
Dept: OBGYN | Facility: CLINIC | Age: 25
End: 2025-08-01
Payer: MEDICAID

## 2025-08-01 ENCOUNTER — NON-APPOINTMENT (OUTPATIENT)
Age: 25
End: 2025-08-01

## 2025-08-01 VITALS
WEIGHT: 96 LBS | BODY MASS INDEX: 18.12 KG/M2 | DIASTOLIC BLOOD PRESSURE: 68 MMHG | HEIGHT: 61 IN | HEART RATE: 89 BPM | SYSTOLIC BLOOD PRESSURE: 107 MMHG

## 2025-08-01 DIAGNOSIS — N94.6 DYSMENORRHEA, UNSPECIFIED: ICD-10-CM

## 2025-08-01 DIAGNOSIS — N89.8 OTHER SPECIFIED NONINFLAMMATORY DISORDERS OF VAGINA: ICD-10-CM

## 2025-08-01 DIAGNOSIS — B37.31 ACUTE CANDIDIASIS OF VULVA AND VAGINA: ICD-10-CM

## 2025-08-01 PROCEDURE — 99459 PELVIC EXAMINATION: CPT | Mod: NC

## 2025-08-01 PROCEDURE — 99395 PREV VISIT EST AGE 18-39: CPT

## 2025-08-01 RX ORDER — MEDROXYPROGESTERONE ACETATE 150 MG/ML
150 INJECTION, SUSPENSION INTRAMUSCULAR
Qty: 1 | Refills: 4 | Status: ACTIVE | COMMUNITY
Start: 2025-08-01 | End: 1900-01-01

## 2025-08-01 RX ORDER — FLUCONAZOLE 150 MG/1
150 TABLET ORAL
Qty: 1 | Refills: 1 | Status: ACTIVE | COMMUNITY
Start: 2025-08-01 | End: 1900-01-01

## 2025-08-11 ENCOUNTER — APPOINTMENT (OUTPATIENT)
Dept: OBGYN | Facility: CLINIC | Age: 25
End: 2025-08-11

## 2025-08-11 DIAGNOSIS — Z01.419 ENCOUNTER FOR GYNECOLOGICAL EXAMINATION (GENERAL) (ROUTINE) W/OUT ABNORMAL FINDINGS: ICD-10-CM

## 2025-08-11 DIAGNOSIS — Z11.3 ENCOUNTER FOR SCREENING FOR INFECTIONS WITH A PREDOMINANTLY SEXUAL MODE OF TRANSMISSION: ICD-10-CM

## 2025-08-11 DIAGNOSIS — Z12.39 ENCOUNTER FOR OTHER SCREENING FOR MALIGNANT NEOPLASM OF BREAST: ICD-10-CM

## 2025-08-11 DIAGNOSIS — B96.89 ACUTE VAGINITIS: ICD-10-CM

## 2025-08-11 DIAGNOSIS — N76.0 ACUTE VAGINITIS: ICD-10-CM

## 2025-08-11 DIAGNOSIS — Z30.42 ENCOUNTER FOR SURVEILLANCE OF INJECTABLE CONTRACEPTIVE: ICD-10-CM

## 2025-08-13 LAB
A VAGINAE DNA VAG QL NAA+PROBE: ABNORMAL
BVAB2 DNA VAG QL NAA+PROBE: NORMAL
C KRUSEI DNA VAG QL NAA+PROBE: NEGATIVE
C KRUSEI DNA VAG QL NAA+PROBE: POSITIVE
C TRACH RRNA SPEC QL NAA+PROBE: NEGATIVE
CANDIDA DNA VAG QL NAA+PROBE: NEGATIVE
CYTOLOGY CVX/VAG DOC THIN PREP: ABNORMAL
MEGA1 DNA VAG QL NAA+PROBE: NORMAL
N GONORRHOEA RRNA SPEC QL NAA+PROBE: NEGATIVE
T VAGINALIS RRNA SPEC QL NAA+PROBE: NEGATIVE